# Patient Record
Sex: FEMALE | Race: BLACK OR AFRICAN AMERICAN | NOT HISPANIC OR LATINO | Employment: OTHER | URBAN - METROPOLITAN AREA
[De-identification: names, ages, dates, MRNs, and addresses within clinical notes are randomized per-mention and may not be internally consistent; named-entity substitution may affect disease eponyms.]

---

## 2023-03-26 LAB — URINE CULTURE: ABNORMAL

## 2023-11-30 DIAGNOSIS — M10.9 GOUT, UNSPECIFIED CAUSE, UNSPECIFIED CHRONICITY, UNSPECIFIED SITE: Primary | ICD-10-CM

## 2023-11-30 DIAGNOSIS — I10 ESSENTIAL (PRIMARY) HYPERTENSION: ICD-10-CM

## 2023-12-05 RX ORDER — DILTIAZEM HYDROCHLORIDE 300 MG/1
300 CAPSULE, COATED, EXTENDED RELEASE ORAL DAILY
Qty: 100 CAPSULE | Refills: 0 | Status: SHIPPED | OUTPATIENT
Start: 2023-12-05 | End: 2024-02-12

## 2023-12-05 RX ORDER — ALLOPURINOL 100 MG/1
100 TABLET ORAL DAILY
Qty: 100 TABLET | Refills: 0 | Status: SHIPPED | OUTPATIENT
Start: 2023-12-05 | End: 2024-02-12

## 2023-12-05 RX ORDER — ATENOLOL 50 MG/1
50 TABLET ORAL DAILY
Qty: 100 TABLET | Refills: 0 | Status: SHIPPED | OUTPATIENT
Start: 2023-12-05 | End: 2024-02-12

## 2023-12-05 RX ORDER — HYDROCHLOROTHIAZIDE 25 MG/1
25 TABLET ORAL DAILY
Qty: 100 TABLET | Refills: 0 | Status: SHIPPED | OUTPATIENT
Start: 2023-12-05 | End: 2024-02-12

## 2024-01-10 ENCOUNTER — LAB (OUTPATIENT)
Dept: LAB | Facility: LAB | Age: 74
End: 2024-01-10
Payer: MEDICARE

## 2024-01-10 ENCOUNTER — OFFICE VISIT (OUTPATIENT)
Dept: PRIMARY CARE | Facility: CLINIC | Age: 74
End: 2024-01-10
Payer: MEDICARE

## 2024-01-10 VITALS
HEART RATE: 76 BPM | DIASTOLIC BLOOD PRESSURE: 82 MMHG | BODY MASS INDEX: 35.2 KG/M2 | HEIGHT: 66 IN | SYSTOLIC BLOOD PRESSURE: 126 MMHG | WEIGHT: 219 LBS | TEMPERATURE: 98.2 F

## 2024-01-10 DIAGNOSIS — Z00.00 ANNUAL PHYSICAL EXAM: ICD-10-CM

## 2024-01-10 DIAGNOSIS — E55.9 VITAMIN D DEFICIENCY: ICD-10-CM

## 2024-01-10 DIAGNOSIS — E78.2 MIXED HYPERLIPIDEMIA: ICD-10-CM

## 2024-01-10 DIAGNOSIS — M1A.9XX0 CHRONIC GOUT WITHOUT TOPHUS, UNSPECIFIED CAUSE, UNSPECIFIED SITE: ICD-10-CM

## 2024-01-10 DIAGNOSIS — Z23 NEED FOR VACCINATION FOR PNEUMOCOCCUS: ICD-10-CM

## 2024-01-10 DIAGNOSIS — Z00.00 ENCOUNTER FOR MEDICARE ANNUAL WELLNESS EXAM: Primary | ICD-10-CM

## 2024-01-10 DIAGNOSIS — D64.9 ANEMIA, UNSPECIFIED TYPE: ICD-10-CM

## 2024-01-10 DIAGNOSIS — Z13.21 ENCOUNTER FOR VITAMIN DEFICIENCY SCREENING: ICD-10-CM

## 2024-01-10 DIAGNOSIS — Z13.29 SCREENING FOR THYROID DISORDER: ICD-10-CM

## 2024-01-10 DIAGNOSIS — Z13.0 SCREENING, ANEMIA, DEFICIENCY, IRON: ICD-10-CM

## 2024-01-10 DIAGNOSIS — F10.10 ALCOHOL ABUSE: ICD-10-CM

## 2024-01-10 DIAGNOSIS — Z13.1 SCREENING FOR DIABETES MELLITUS: ICD-10-CM

## 2024-01-10 DIAGNOSIS — I10 ESSENTIAL (PRIMARY) HYPERTENSION: ICD-10-CM

## 2024-01-10 DIAGNOSIS — R73.9 HYPERGLYCEMIA: ICD-10-CM

## 2024-01-10 DIAGNOSIS — E66.01 MORBID OBESITY DUE TO EXCESS CALORIES (MULTI): ICD-10-CM

## 2024-01-10 DIAGNOSIS — F10.20 UNCOMPLICATED ALCOHOL DEPENDENCE (MULTI): ICD-10-CM

## 2024-01-10 LAB
25(OH)D3 SERPL-MCNC: >120 NG/ML (ref 30–100)
ALBUMIN SERPL BCP-MCNC: 3.8 G/DL (ref 3.4–5)
ALP SERPL-CCNC: 78 U/L (ref 33–136)
ALT SERPL W P-5'-P-CCNC: 16 U/L (ref 7–45)
AMORPH CRY #/AREA UR COMP ASSIST: ABNORMAL /HPF
ANION GAP SERPL CALC-SCNC: 14 MMOL/L (ref 10–20)
APPEARANCE UR: ABNORMAL
AST SERPL W P-5'-P-CCNC: 43 U/L (ref 9–39)
BACTERIA #/AREA URNS AUTO: ABNORMAL /HPF
BASOPHILS # BLD AUTO: 0.07 X10*3/UL (ref 0–0.1)
BASOPHILS NFR BLD AUTO: 1.5 %
BILIRUB SERPL-MCNC: 0.9 MG/DL (ref 0–1.2)
BILIRUB UR STRIP.AUTO-MCNC: NEGATIVE MG/DL
BUN SERPL-MCNC: 10 MG/DL (ref 6–23)
CALCIUM SERPL-MCNC: 9.8 MG/DL (ref 8.6–10.3)
CHLORIDE SERPL-SCNC: 94 MMOL/L (ref 98–107)
CHOLEST SERPL-MCNC: 218 MG/DL (ref 0–199)
CHOLESTEROL/HDL RATIO: 2.2
CO2 SERPL-SCNC: 30 MMOL/L (ref 21–32)
COLOR UR: YELLOW
CREAT SERPL-MCNC: 0.86 MG/DL (ref 0.5–1.05)
EGFRCR SERPLBLD CKD-EPI 2021: 72 ML/MIN/1.73M*2
EOSINOPHIL # BLD AUTO: 0.17 X10*3/UL (ref 0–0.4)
EOSINOPHIL NFR BLD AUTO: 3.7 %
ERYTHROCYTE [DISTWIDTH] IN BLOOD BY AUTOMATED COUNT: 12.7 % (ref 11.5–14.5)
GLUCOSE SERPL-MCNC: 89 MG/DL (ref 74–99)
GLUCOSE UR STRIP.AUTO-MCNC: NEGATIVE MG/DL
HCT VFR BLD AUTO: 39.9 % (ref 36–46)
HDLC SERPL-MCNC: 100.8 MG/DL
HGB BLD-MCNC: 13.3 G/DL (ref 12–16)
HYALINE CASTS #/AREA URNS AUTO: ABNORMAL /LPF
IMM GRANULOCYTES # BLD AUTO: 0.01 X10*3/UL (ref 0–0.5)
IMM GRANULOCYTES NFR BLD AUTO: 0.2 % (ref 0–0.9)
KETONES UR STRIP.AUTO-MCNC: ABNORMAL MG/DL
LDLC SERPL CALC-MCNC: 98 MG/DL
LEUKOCYTE ESTERASE UR QL STRIP.AUTO: ABNORMAL
LYMPHOCYTES # BLD AUTO: 1.45 X10*3/UL (ref 0.8–3)
LYMPHOCYTES NFR BLD AUTO: 31.9 %
MCH RBC QN AUTO: 33.7 PG (ref 26–34)
MCHC RBC AUTO-ENTMCNC: 33.3 G/DL (ref 32–36)
MCV RBC AUTO: 101 FL (ref 80–100)
MONOCYTES # BLD AUTO: 0.4 X10*3/UL (ref 0.05–0.8)
MONOCYTES NFR BLD AUTO: 8.8 %
MUCOUS THREADS #/AREA URNS AUTO: ABNORMAL /LPF
NEUTROPHILS # BLD AUTO: 2.45 X10*3/UL (ref 1.6–5.5)
NEUTROPHILS NFR BLD AUTO: 53.9 %
NITRITE UR QL STRIP.AUTO: NEGATIVE
NON HDL CHOLESTEROL: 117 MG/DL (ref 0–149)
NRBC BLD-RTO: 0 /100 WBCS (ref 0–0)
PH UR STRIP.AUTO: 5 [PH]
PLATELET # BLD AUTO: 426 X10*3/UL (ref 150–450)
POTASSIUM SERPL-SCNC: 4.6 MMOL/L (ref 3.5–5.3)
PROT SERPL-MCNC: 6.7 G/DL (ref 6.4–8.2)
PROT UR STRIP.AUTO-MCNC: NEGATIVE MG/DL
RBC # BLD AUTO: 3.95 X10*6/UL (ref 4–5.2)
RBC # UR STRIP.AUTO: NEGATIVE /UL
RBC #/AREA URNS AUTO: ABNORMAL /HPF
SODIUM SERPL-SCNC: 133 MMOL/L (ref 136–145)
SP GR UR STRIP.AUTO: 1.01
SQUAMOUS #/AREA URNS AUTO: ABNORMAL /HPF
TRIGL SERPL-MCNC: 94 MG/DL (ref 0–149)
TSH SERPL-ACNC: 3.2 MIU/L (ref 0.44–3.98)
UROBILINOGEN UR STRIP.AUTO-MCNC: <2 MG/DL
VLDL: 19 MG/DL (ref 0–40)
WBC # BLD AUTO: 4.6 X10*3/UL (ref 4.4–11.3)
WBC #/AREA URNS AUTO: ABNORMAL /HPF

## 2024-01-10 PROCEDURE — 83036 HEMOGLOBIN GLYCOSYLATED A1C: CPT

## 2024-01-10 PROCEDURE — 3008F BODY MASS INDEX DOCD: CPT | Performed by: INTERNAL MEDICINE

## 2024-01-10 PROCEDURE — 1036F TOBACCO NON-USER: CPT | Performed by: INTERNAL MEDICINE

## 2024-01-10 PROCEDURE — 84443 ASSAY THYROID STIM HORMONE: CPT

## 2024-01-10 PROCEDURE — 85025 COMPLETE CBC W/AUTO DIFF WBC: CPT

## 2024-01-10 PROCEDURE — G0444 DEPRESSION SCREEN ANNUAL: HCPCS | Performed by: INTERNAL MEDICINE

## 2024-01-10 PROCEDURE — 80053 COMPREHEN METABOLIC PANEL: CPT

## 2024-01-10 PROCEDURE — G0009 ADMIN PNEUMOCOCCAL VACCINE: HCPCS | Performed by: INTERNAL MEDICINE

## 2024-01-10 PROCEDURE — 1170F FXNL STATUS ASSESSED: CPT | Performed by: INTERNAL MEDICINE

## 2024-01-10 PROCEDURE — 90677 PCV20 VACCINE IM: CPT | Performed by: INTERNAL MEDICINE

## 2024-01-10 PROCEDURE — 36415 COLL VENOUS BLD VENIPUNCTURE: CPT

## 2024-01-10 PROCEDURE — 82306 VITAMIN D 25 HYDROXY: CPT

## 2024-01-10 PROCEDURE — 80061 LIPID PANEL: CPT

## 2024-01-10 PROCEDURE — 3079F DIAST BP 80-89 MM HG: CPT | Performed by: INTERNAL MEDICINE

## 2024-01-10 PROCEDURE — 1159F MED LIST DOCD IN RCRD: CPT | Performed by: INTERNAL MEDICINE

## 2024-01-10 PROCEDURE — 81001 URINALYSIS AUTO W/SCOPE: CPT

## 2024-01-10 PROCEDURE — 3074F SYST BP LT 130 MM HG: CPT | Performed by: INTERNAL MEDICINE

## 2024-01-10 PROCEDURE — 99214 OFFICE O/P EST MOD 30 MIN: CPT | Performed by: INTERNAL MEDICINE

## 2024-01-10 PROCEDURE — G0442 ANNUAL ALCOHOL SCREEN 15 MIN: HCPCS | Performed by: INTERNAL MEDICINE

## 2024-01-10 PROCEDURE — G0439 PPPS, SUBSEQ VISIT: HCPCS | Performed by: INTERNAL MEDICINE

## 2024-01-10 RX ORDER — ESTRADIOL 0.1 MG/G
CREAM VAGINAL
COMMUNITY
Start: 2021-05-18

## 2024-01-10 RX ORDER — CLOBETASOL PROPIONATE 0.5 MG/G
CREAM TOPICAL
COMMUNITY
End: 2024-01-24 | Stop reason: SDUPTHER

## 2024-01-10 RX ORDER — FUROSEMIDE 20 MG/1
20 TABLET ORAL DAILY
COMMUNITY
End: 2024-01-10 | Stop reason: ALTCHOICE

## 2024-01-10 RX ORDER — TRAMADOL HYDROCHLORIDE 50 MG/1
TABLET ORAL
COMMUNITY
Start: 2023-09-26 | End: 2024-01-10 | Stop reason: ALTCHOICE

## 2024-01-10 ASSESSMENT — ENCOUNTER SYMPTOMS
DEPRESSION: 0
OCCASIONAL FEELINGS OF UNSTEADINESS: 0
LOSS OF SENSATION IN FEET: 0

## 2024-01-10 ASSESSMENT — ACTIVITIES OF DAILY LIVING (ADL)
TAKING_MEDICATION: INDEPENDENT
DOING_HOUSEWORK: INDEPENDENT
MANAGING_FINANCES: INDEPENDENT
DRESSING: INDEPENDENT
GROCERY_SHOPPING: INDEPENDENT
BATHING: INDEPENDENT

## 2024-01-10 ASSESSMENT — PATIENT HEALTH QUESTIONNAIRE - PHQ9
1. LITTLE INTEREST OR PLEASURE IN DOING THINGS: NOT AT ALL
SUM OF ALL RESPONSES TO PHQ9 QUESTIONS 1 AND 2: 0
2. FEELING DOWN, DEPRESSED OR HOPELESS: NOT AT ALL

## 2024-01-10 ASSESSMENT — COLUMBIA-SUICIDE SEVERITY RATING SCALE - C-SSRS
1. IN THE PAST MONTH, HAVE YOU WISHED YOU WERE DEAD OR WISHED YOU COULD GO TO SLEEP AND NOT WAKE UP?: NO
2. HAVE YOU ACTUALLY HAD ANY THOUGHTS OF KILLING YOURSELF?: NO
6. HAVE YOU EVER DONE ANYTHING, STARTED TO DO ANYTHING, OR PREPARED TO DO ANYTHING TO END YOUR LIFE?: NO

## 2024-01-10 NOTE — PROGRESS NOTES
Primary Care Physician: Elliot Reeys MD    Date of Visit: 01/10/2024    Chief Complaint:     Chief Complaint   Patient presents with    Medicare Annual Wellness Visit Subsequent       Subjective:  Patient Brie Trevino is a 73 y.o. female  that presents for Medicare Wellness    HPI:  HPI  Pt is here for medicare well visit.  States that her overall health is good    However, she is lonely.  She has friends.  Does go out. Plays bingo.  But, she is still lonely.  States that she has been consuming etoh; sometimes up to a half a pint daily. Does not drink and drive.  Denies depression    Had a life screening test.  Showed high chol.  She has had high chol, but did not want to start medication.    Past Medical History:  History reviewed. No pertinent past medical history.     Surgical History:  History reviewed. No pertinent surgical history.     Immunizations:   Immunization History   Administered Date(s) Administered    Pfizer Purple Cap SARS-CoV-2 04/18/2021, 05/10/2021, 12/08/2021        Family History:  No family history on file.     Social History:  Social History     Socioeconomic History    Marital status: Single     Spouse name: Not on file    Number of children: Not on file    Years of education: Not on file    Highest education level: Not on file   Occupational History    Not on file   Tobacco Use    Smoking status: Never     Passive exposure: Never    Smokeless tobacco: Never   Substance and Sexual Activity    Alcohol use: Not on file    Drug use: Not on file    Sexual activity: Not on file   Other Topics Concern    Not on file   Social History Narrative    Not on file     Social Determinants of Health     Financial Resource Strain: Not on file   Food Insecurity: Not on file   Transportation Needs: Not on file   Physical Activity: Not on file   Stress: Not on file   Social Connections: Not on file   Intimate Partner Violence: Not on file   Housing Stability: Not on file        Medications:  Current  "Outpatient Medications   Medication Instructions    allopurinol (ZYLOPRIM) 100 mg, oral, Daily    atenolol (TENORMIN) 50 mg, oral, Daily    clobetasol (Temovate) 0.05 % cream APPLY THIN LAYER TO AFFECTED AREA TWICE A DAY    dilTIAZem CD (CARDIZEM CD) 300 mg, oral, Daily    estradiol (Estrace) 0.01 % (0.1 mg/gram) vaginal cream vaginal    hydroCHLOROthiazide (HYDRODIURIL) 25 mg, oral, Daily        Allergies:  Allergies   Allergen Reactions    Lisinopril Swelling     Swelling on face        ROS:  Review of Systems   Constitutional:  Negative for activity change, chills, fatigue, fever and unexpected weight change.   HENT:  Negative for congestion, dental problem, ear pain, sinus pressure, sinus pain, sore throat and trouble swallowing.    Eyes:  Negative for photophobia, discharge, redness and visual disturbance.   Respiratory:  Negative for cough, chest tightness, shortness of breath and wheezing.    Cardiovascular:  Negative for chest pain, palpitations and leg swelling.   Gastrointestinal:  Negative for abdominal pain, blood in stool, constipation, diarrhea, nausea and vomiting.   Endocrine: Negative for cold intolerance, heat intolerance, polydipsia, polyphagia and polyuria.   Genitourinary:  Negative for difficulty urinating, dysuria, flank pain, frequency and urgency.   Musculoskeletal:  Negative for arthralgias, joint swelling and myalgias.   Skin:  Negative for color change and rash.   Allergic/Immunologic: Negative for environmental allergies, food allergies and immunocompromised state.   Neurological:  Negative for dizziness, weakness, light-headedness, numbness and headaches.   Hematological:  Does not bruise/bleed easily.   Psychiatric/Behavioral:  Negative for dysphoric mood and sleep disturbance. The patient is not nervous/anxious.         No anxiety.  No depression        Vitals:  /82   Pulse 76   Temp 36.8 °C (98.2 °F)   Ht 1.676 m (5' 6\")   Wt 99.3 kg (219 lb)   BMI 35.35 kg/m²   Wt " Readings from Last 2 Encounters:   01/10/24 99.3 kg (219 lb)       Physical Exam:  Physical Exam  Constitutional:       General: She is not in acute distress.     Appearance: Normal appearance. She is well-developed and well-groomed. She is obese.   HENT:      Head: Normocephalic and atraumatic.      Right Ear: Tympanic membrane and ear canal normal.      Left Ear: Tympanic membrane and ear canal normal.      Nose: Nose normal.      Mouth/Throat:      Mouth: Mucous membranes are moist.      Pharynx: Oropharynx is clear.   Eyes:      Conjunctiva/sclera: Conjunctivae normal.      Pupils: Pupils are equal, round, and reactive to light.   Neck:      Thyroid: No thyromegaly.   Cardiovascular:      Rate and Rhythm: Normal rate and regular rhythm.      Heart sounds: Normal heart sounds, S1 normal and S2 normal. No murmur heard.  Pulmonary:      Effort: Pulmonary effort is normal.      Breath sounds: Normal breath sounds and air entry. No wheezing, rhonchi or rales.   Abdominal:      General: Bowel sounds are normal. There is no distension.      Palpations: Abdomen is soft.      Tenderness: There is no abdominal tenderness. There is no guarding or rebound.   Musculoskeletal:         General: No swelling or tenderness. Normal range of motion.      Cervical back: Normal, full passive range of motion without pain, normal range of motion and neck supple.      Thoracic back: Normal.      Lumbar back: Normal.      Right lower leg: No edema.      Left lower leg: No edema.      Comments: Upper and lower extrems are wnl--inspection and palpation.   Strength is normal and symmetric.   Lymphadenopathy:      Cervical: No cervical adenopathy.   Skin:     General: Skin is warm and dry.      Findings: No bruising, erythema, lesion or rash.      Comments: Intact.   Had a wound on the lower left leg following a burst blister.  Area has healed. Center is pink with hyperpigmentation surrounding.   Neurological:      General: No focal deficit  present.      Mental Status: She is alert and oriented to person, place, and time.      Sensory: Sensation is intact.      Motor: Motor function is intact.      Deep Tendon Reflexes: Reflexes are normal and symmetric.   Psychiatric:         Mood and Affect: Mood and affect normal.         Speech: Speech normal.         Behavior: Behavior normal. Behavior is cooperative.           Orders:  No orders of the defined types were placed in this encounter.        Future Appointments:  Future Appointments   Date Time Provider Department [place]   2/21/2024  9:15 AM Susan L Mayne, APRN-CNP [id number]             Assessment/Plan:  Diagnoses and all orders for this visit:  Encounter for Medicare annual wellness exam  Annual physical exam  -     CBC and Auto Differential; Future  -     Comprehensive Metabolic Panel; Future  -     Lipid Panel; Future  -     Urinalysis with Reflex Microscopic  -     Vitamin D 25-Hydroxy,Total (for eval of Vitamin D levels); Future  -     TSH with reflex to Free T4 if abnormal; Future  -     Hemoglobin A1C; Future  -     Microscopic Only, Urine  Mixed hyperlipidemia  -     Lipid Panel; Future  Essential (primary) hypertension  Chronic gout without tophus, unspecified cause, unspecified site  Encounter for vitamin deficiency screening  -     Vitamin D 25-Hydroxy,Total (for eval of Vitamin D levels); Future  Screening for thyroid disorder  -     TSH with reflex to Free T4 if abnormal; Future  Screening, anemia, deficiency, iron  -     CBC and Auto Differential; Future  Screening for diabetes mellitus  -     Hemoglobin A1C; Future  Hyperglycemia  -     Hemoglobin A1C; Future  Anemia, unspecified type  -     CBC and Auto Differential; Future  Vitamin D deficiency  -     Vitamin D 25-Hydroxy,Total (for eval of Vitamin D levels); Future  Need for vaccination for pneumococcus  -     Pneumococcal conjugate vaccine, 20-valent (PREVNAR 20)  Morbid obesity due to excess calories (CMS/[facility])  BMI  35.0-35.9,adult  Alcohol abuse  Uncomplicated alcohol dependence (CMS/HCC)                Elliot Reyes MD

## 2024-01-11 PROBLEM — I10 ESSENTIAL (PRIMARY) HYPERTENSION: Status: ACTIVE | Noted: 2024-01-11

## 2024-01-11 PROBLEM — Z00.00 ENCOUNTER FOR MEDICARE ANNUAL WELLNESS EXAM: Status: ACTIVE | Noted: 2024-01-11

## 2024-01-11 PROBLEM — E78.2 MIXED HYPERLIPIDEMIA: Status: ACTIVE | Noted: 2024-01-11

## 2024-01-11 PROBLEM — M1A.9XX0 CHRONIC GOUT WITHOUT TOPHUS: Status: ACTIVE | Noted: 2024-01-11

## 2024-01-11 LAB
EST. AVERAGE GLUCOSE BLD GHB EST-MCNC: 100 MG/DL
HBA1C MFR BLD: 5.1 %

## 2024-01-11 ASSESSMENT — ENCOUNTER SYMPTOMS
POLYPHAGIA: 0
HEADACHES: 0
PALPITATIONS: 0
SINUS PAIN: 0
VOMITING: 0
NUMBNESS: 0
JOINT SWELLING: 0
ABDOMINAL PAIN: 0
UNEXPECTED WEIGHT CHANGE: 0
ARTHRALGIAS: 0
EYE DISCHARGE: 0
DIARRHEA: 0
BRUISES/BLEEDS EASILY: 0
SORE THROAT: 0
COUGH: 0
NERVOUS/ANXIOUS: 0
CHILLS: 0
FEVER: 0
DYSURIA: 0
WHEEZING: 0
WEAKNESS: 0
FREQUENCY: 0
DYSPHORIC MOOD: 0
TROUBLE SWALLOWING: 0
SINUS PRESSURE: 0
ACTIVITY CHANGE: 0
MYALGIAS: 0
PHOTOPHOBIA: 0
NAUSEA: 0
SLEEP DISTURBANCE: 0
DIZZINESS: 0
SHORTNESS OF BREATH: 0
FLANK PAIN: 0
EYE REDNESS: 0
BLOOD IN STOOL: 0
DIFFICULTY URINATING: 0
CHEST TIGHTNESS: 0
CONSTIPATION: 0
LIGHT-HEADEDNESS: 0
FATIGUE: 0
COLOR CHANGE: 0
POLYDIPSIA: 0

## 2024-01-24 DIAGNOSIS — L43.9 LICHEN PLANUS: Primary | ICD-10-CM

## 2024-01-26 RX ORDER — CLOBETASOL PROPIONATE 0.5 MG/G
CREAM TOPICAL
Qty: 60 G | Refills: 1 | Status: SHIPPED | OUTPATIENT
Start: 2024-01-26 | End: 2024-03-13 | Stop reason: SDUPTHER

## 2024-02-01 ENCOUNTER — TELEPHONE (OUTPATIENT)
Dept: DERMATOLOGY | Facility: CLINIC | Age: 74
End: 2024-02-01
Payer: MEDICARE

## 2024-02-01 NOTE — TELEPHONE ENCOUNTER
Pt called at this time and states that she was rescheduled for 2/16/24 and is unable to make that appointment.     Can someone reach out to patient to reschedule her for sometime after 2/20/24?    Thank you!    Benson Armenta LPN

## 2024-02-06 DIAGNOSIS — I10 ESSENTIAL (PRIMARY) HYPERTENSION: ICD-10-CM

## 2024-02-06 DIAGNOSIS — M10.9 GOUT, UNSPECIFIED CAUSE, UNSPECIFIED CHRONICITY, UNSPECIFIED SITE: ICD-10-CM

## 2024-02-12 RX ORDER — ATENOLOL 50 MG/1
50 TABLET ORAL DAILY
Qty: 100 TABLET | Refills: 1 | Status: SHIPPED | OUTPATIENT
Start: 2024-02-12 | End: 2024-08-30

## 2024-02-12 RX ORDER — ALLOPURINOL 100 MG/1
100 TABLET ORAL DAILY
Qty: 100 TABLET | Refills: 1 | Status: SHIPPED | OUTPATIENT
Start: 2024-02-12 | End: 2024-08-30

## 2024-02-12 RX ORDER — HYDROCHLOROTHIAZIDE 25 MG/1
25 TABLET ORAL DAILY
Qty: 100 TABLET | Refills: 1 | Status: SHIPPED | OUTPATIENT
Start: 2024-02-12 | End: 2024-08-30

## 2024-02-12 RX ORDER — DILTIAZEM HYDROCHLORIDE 300 MG/1
300 CAPSULE, COATED, EXTENDED RELEASE ORAL DAILY
Qty: 100 CAPSULE | Refills: 1 | Status: SHIPPED | OUTPATIENT
Start: 2024-02-12 | End: 2024-08-30

## 2024-02-16 ENCOUNTER — APPOINTMENT (OUTPATIENT)
Dept: DERMATOLOGY | Facility: CLINIC | Age: 74
End: 2024-02-16
Payer: MEDICARE

## 2024-03-13 ENCOUNTER — OFFICE VISIT (OUTPATIENT)
Dept: DERMATOLOGY | Facility: CLINIC | Age: 74
End: 2024-03-13
Payer: MEDICARE

## 2024-03-13 DIAGNOSIS — L85.3 XEROSIS CUTIS: Primary | ICD-10-CM

## 2024-03-13 DIAGNOSIS — L90.0 LICHEN SCLEROSUS ET ATROPHICUS: ICD-10-CM

## 2024-03-13 DIAGNOSIS — L81.4 LENTIGO: ICD-10-CM

## 2024-03-13 DIAGNOSIS — L43.9 LICHEN PLANUS: ICD-10-CM

## 2024-03-13 DIAGNOSIS — D18.01 HEMANGIOMA OF SKIN: ICD-10-CM

## 2024-03-13 DIAGNOSIS — Z12.83 ENCOUNTER FOR SCREENING FOR MALIGNANT NEOPLASM OF SKIN: ICD-10-CM

## 2024-03-13 DIAGNOSIS — R21 RASH AND OTHER NONSPECIFIC SKIN ERUPTION: ICD-10-CM

## 2024-03-13 DIAGNOSIS — L82.1 SEBORRHEIC KERATOSIS: ICD-10-CM

## 2024-03-13 PROCEDURE — 1036F TOBACCO NON-USER: CPT | Performed by: NURSE PRACTITIONER

## 2024-03-13 PROCEDURE — 1159F MED LIST DOCD IN RCRD: CPT | Performed by: NURSE PRACTITIONER

## 2024-03-13 PROCEDURE — 99214 OFFICE O/P EST MOD 30 MIN: CPT | Performed by: NURSE PRACTITIONER

## 2024-03-13 PROCEDURE — 3008F BODY MASS INDEX DOCD: CPT | Performed by: NURSE PRACTITIONER

## 2024-03-13 RX ORDER — CLOBETASOL PROPIONATE 0.5 MG/G
CREAM TOPICAL
Qty: 60 G | Refills: 2 | Status: SHIPPED | OUTPATIENT
Start: 2024-03-13

## 2024-03-13 RX ORDER — AMMONIUM LACTATE 12 G/100G
CREAM TOPICAL
Qty: 385 G | Refills: 9 | Status: SHIPPED | OUTPATIENT
Start: 2024-03-13

## 2024-03-13 RX ORDER — TRIAMCINOLONE ACETONIDE 1 MG/G
CREAM TOPICAL
Qty: 80 G | Refills: 3 | Status: SHIPPED | OUTPATIENT
Start: 2024-03-13

## 2024-03-13 NOTE — PROGRESS NOTES
Subjective     Brie Trevino is a 74 y.o. female who presents for the following: No chief complaint on file..     Review of Systems:  No other skin or systemic complaints other than what is documented elsewhere in the note.    The following portions of the chart were reviewed this encounter and updated as appropriate:       Skin Cancer History  No skin cancer on file.    Specialty Problems    None    Past Medical History:  Brie Trevino  has no past medical history on file.    Past Surgical History:  Brie Trevino  has no past surgical history on file.    Family History:  Patient family history is not on file.    Social History:  Brie Trevino  reports that she has never smoked. She has never been exposed to tobacco smoke. She has never used smokeless tobacco. No history on file for alcohol use and drug use.    Allergies:  Lisinopril    Current Medications / CAM's:    Current Outpatient Medications:     allopurinol (Zyloprim) 100 mg tablet, Take 1 tablet (100 mg) by mouth once daily., Disp: 100 tablet, Rfl: 1    ammonium lactate (Amlactin) 12 % cream, To arms, legs, and trunk daily after shower., Disp: 385 g, Rfl: 9    atenolol (Tenormin) 50 mg tablet, Take 1 tablet (50 mg) by mouth once daily., Disp: 100 tablet, Rfl: 1    clobetasol (Temovate) 0.05 % cream, Thin layer to affected area daily as needed, Disp: 60 g, Rfl: 2    dilTIAZem CD (Cardizem CD) 300 mg 24 hr capsule, Take 1 capsule (300 mg) by mouth once daily., Disp: 100 capsule, Rfl: 1    estradiol (Estrace) 0.01 % (0.1 mg/gram) vaginal cream, Insert into the vagina., Disp: , Rfl:     hydroCHLOROthiazide (HYDRODiuril) 25 mg tablet, Take 1 tablet (25 mg) by mouth once daily., Disp: 100 tablet, Rfl: 1    triamcinolone (Kenalog) 0.1 % cream, Thin coat twice daily to affected areas on  for 3-4 weeks, then weekends only. Repeat every few months for flares., Disp: 80 g, Rfl: 3     Objective   Well appearing patient in no apparent distress; mood and affect are  within normal limits.    A focused skin examination was performed. All findings within normal limits unless otherwise noted below.    Assessment/Plan   1. Xerosis cutis  Fine, ashy, pale to white scale diffusely over skin.    Often itchy, dry skin is caused by environmental factors, such as cold weather and frequent bathing, and by medical conditions, such as atopic dermatitis and malnutrition. Dry skin develops due to a decrease in the natural oils in the outer layer of skin, which makes the skin lose water.    Healthy bathing habits can improve dry skin:  - Take a bath or shower only once daily. More frequent bathing can make the skin lose water (dehydrate).   - Use lukewarm (not hot) water.   - Limit bath time to 15 minutes.   - Avoid harsh deodorant soaps (or limit their use to armpits, groin, and feet).   - Use non-soap cleansers.    -Pat (don't rub) the skin dry after bathing.   - Apply moisturizer immediately after bathing, while the skin is still moist.   - When choosing a moisturizer, look for oil-based creams and ointments, which work better than water-based lotions.     The following over-the-counter products may be helpful:  - Petrolatum or petroleum jelly (Vaseline)   - Fragrance-free creams or ointments   - Preparations containing alpha-hydroxy acids such as glycolic acid or lactic acid   - Creams containing urea   - Over-the-counter cortisone cream (if the areas are itchy)   - Topical antibiotics applied immediately to any cracks in the skin to help prevent infection    Plan  - Follow abovementioned guidelines.   - Risks, benefits, side effects, alternatives and options were discussed with patient and the patient voiced understanding.      Related Medications  ammonium lactate (Amlactin) 12 % cream  To arms, legs, and trunk daily after shower.    2. Rash and other nonspecific skin eruption    Related Medications  triamcinolone (Kenalog) 0.1 % cream  Thin coat twice daily to affected areas on  for 3-4  weeks, then weekends only. Repeat every few months for flares.    3. Lichen planus    Related Medications  clobetasol (Temovate) 0.05 % cream  Thin layer to affected area daily as needed    4. Encounter for screening for malignant neoplasm of skin  Scattered benign lesions    - Protective measures, such as avoiding skin exposure to sunlight during peak sun hours (10 AM to 3 PM), wearing protective clothing, and applying high-SPF sunscreen, are essential for reducing exposure to harmful ultraviolet (UV) light.  - Monthly self-examination of the skin is helpful to detect new lesions or changes in existing lesions.  - Discussed signs and symptoms of sun-related skin cancers.   - Make sure your moles are not signs of skin cancer (melanoma). Remember the ABCDEs of melanoma lesions:  A - Asymmetry: One half of the lesion does not mirror the other half.  B - Border: The borders are irregular or vague (indistinct).  C - Color: More than one color may be noted within the mole.  D - Diameter: Size greater than 6 mm (roughly the size of a pencil eraser) may be concerning.  E - Evolving: Notable changes in the lesion over time are suspicious signs for skin cancer.    5. Hemangioma of skin  Violaceous/red papule with maroon lagoons     - A cherry hemangioma is a small macule (small, flat, smooth area) or papule (small, solid bump) formed from an overgrowth of tiny blood vessels in the skin. Cherry hemangiomas are characteristically red or purplish in color. They often first appear in middle adulthood and usually increase in number with age. Cherry hemangiomas are noncancerous (benign) and are common in adults.  - Lesions are benign, reassured patient.     6. Seborrheic keratosis  Stuck on verrucous, tan-brown papules and plaques.      Although Seborrheic Keratoses can be troublesome and unsightly, they are entirely benign.  Removal of Seborrheic Keratoses is considered a cosmetic procedure. Removal is typically performed using  liquid nitrogen cryotherapy.  Treatment of current lesions does not prevent the development of new Seborrheic Keratoses in the future.    7. Lentigo  Scattered tan macules in sun-exposed areas.    A solar lentigo (plural, solar lentigines), sometimes called an age spot or liver spot, is a brown macule (small, flat, smooth area of skin) caused by chronic sun or artificial ultraviolet (UV) light exposure. There may be just one lentigo or there may be multiple. This type of lentigo is different from lentigo simplex (discussed separately) because it is caused by exposure to UV light. Solar lentigines are benign, but they do indicate excessive sun exposure, a risk factor for the development of skin cancer.  Lesions are benign, no treatment needed.     8. Lichen sclerosus et atrophicus  Pubic  Lichenified, excoriated papules and plaques.     - Lichen simplex chronicus (LSC), also known as neurodermatitis circumscripta, is an itchy skin condition causing thickened skin at the areas of skin injured by repeated scratching and rubbing. Lichen simplex chronicus is not a primary disease but rather the skin's response to chronic physical injury (trauma). The gradual thickening of skin, caused by repetitive scratching and rubbing, is called lichenification.  - Lichen simplex chronicus begins as itchy skin. The itching leads to scratching and rubbing, which causes thickening of skin. The thickened skin is itchy, which causes more scratching and, thus, more skin thickening. This scratch-itch cycle continues if not treated.  - The primary treatment is to stop scratching. However, this can be very difficult once a scratch-itch cycle has started. Areas of lichen simplex chronicus may need to be covered at night, as many people scratch in their sleep.  Use moisturizers to help relieve itchy skin. When choosing a moisturizer, look for oil-based creams and ointments, which work better than water-based lotions. Apply moisturizers just  after bathing, while the skin is still moist.  Apply over-the-counter hydrocortisone cream to decrease the itch. However, if the itching is limited to the groin area, you may have a fungal infection (jock itch [tinea cruris]) rather than lichen simplex chronicus. Do not apply hydrocortisone to the groin area unless recommended to do so by a doctor.  If there are breaks or cracks in the skin, apply an antibiotic ointment to prevent infection.  Plan  - Continue clobetasol ointment, use as directed.

## 2024-04-10 ENCOUNTER — OFFICE VISIT (OUTPATIENT)
Dept: PRIMARY CARE | Facility: CLINIC | Age: 74
End: 2024-04-10
Payer: MEDICARE

## 2024-04-10 VITALS
HEIGHT: 66 IN | SYSTOLIC BLOOD PRESSURE: 126 MMHG | RESPIRATION RATE: 16 BRPM | TEMPERATURE: 98.2 F | WEIGHT: 186 LBS | HEART RATE: 75 BPM | BODY MASS INDEX: 29.89 KG/M2 | OXYGEN SATURATION: 98 % | DIASTOLIC BLOOD PRESSURE: 76 MMHG

## 2024-04-10 DIAGNOSIS — L30.9 DERMATITIS: ICD-10-CM

## 2024-04-10 DIAGNOSIS — M17.0 PRIMARY OSTEOARTHRITIS OF BOTH KNEES: Primary | ICD-10-CM

## 2024-04-10 PROCEDURE — 3008F BODY MASS INDEX DOCD: CPT | Performed by: INTERNAL MEDICINE

## 2024-04-10 PROCEDURE — 3078F DIAST BP <80 MM HG: CPT | Performed by: INTERNAL MEDICINE

## 2024-04-10 PROCEDURE — 99213 OFFICE O/P EST LOW 20 MIN: CPT | Performed by: INTERNAL MEDICINE

## 2024-04-10 PROCEDURE — 3074F SYST BP LT 130 MM HG: CPT | Performed by: INTERNAL MEDICINE

## 2024-04-10 PROCEDURE — 1159F MED LIST DOCD IN RCRD: CPT | Performed by: INTERNAL MEDICINE

## 2024-04-10 RX ORDER — DESOXIMETASONE 0.5 MG/G
1 CREAM TOPICAL 2 TIMES DAILY
Qty: 60 G | Refills: 0 | Status: SHIPPED | OUTPATIENT
Start: 2024-04-10

## 2024-04-10 NOTE — PROGRESS NOTES
"Primary Care Physician: Elliot Reyes MD    Date of Visit: 04/10/2024     Chief Complaint:   Chief Complaint   Patient presents with    Follow-up    Med Refill       Subjective:   Brie Trevino is a 74 y.o. female presents     HPI:  HPI  Needs a copy of medical records to try to obtain ins thru  group  Would like a  refill of Topicort for dermatitis  Osteoarthritis bilat knees.  Disabiltity placard renewed.    Otherwise, she has been well.   No new c/o    Past Medical History:  No past medical history on file.     Social History:   reports that she has never smoked. She has never been exposed to tobacco smoke. She has never used smokeless tobacco.     Family History:  family history is not on file.      Allergies:  Allergies   Allergen Reactions    Lisinopril Swelling     Swelling on face       Outpatient Medications:  Current Outpatient Medications   Medication Instructions    allopurinol (ZYLOPRIM) 100 mg, oral, Daily    ammonium lactate (Amlactin) 12 % cream To arms, legs, and trunk daily after shower.    atenolol (TENORMIN) 50 mg, oral, Daily    clobetasol (Temovate) 0.05 % cream Thin layer to affected area daily as needed    dilTIAZem CD (CARDIZEM CD) 300 mg, oral, Daily    estradiol (Estrace) 0.01 % (0.1 mg/gram) vaginal cream vaginal    hydroCHLOROthiazide (HYDRODIURIL) 25 mg, oral, Daily    triamcinolone (Kenalog) 0.1 % cream Thin coat twice daily to affected areas on  for 3-4 weeks, then weekends only. Repeat every few months for flares.         ROS:   Review of Systems     Vitals:  /76 (BP Location: Right arm, Patient Position: Sitting, BP Cuff Size: Adult)   Pulse 75   Temp 36.8 °C (98.2 °F) (Temporal)   Resp 16   Ht 1.676 m (5' 6\")   Wt 84.4 kg (186 lb)   SpO2 98%   BMI 30.02 kg/m²   Wt Readings from Last 2 Encounters:   04/10/24 84.4 kg (186 lb)   01/10/24 99.3 kg (219 lb)       Physical Exam:  Physical Exam  Vitals reviewed.   Constitutional:       Appearance: Normal " appearance.   HENT:      Head: Normocephalic and atraumatic.   Cardiovascular:      Rate and Rhythm: Normal rate and regular rhythm.      Heart sounds: Normal heart sounds, S1 normal and S2 normal. No murmur heard.  Pulmonary:      Effort: Pulmonary effort is normal.      Breath sounds: Normal breath sounds. No wheezing, rhonchi or rales.   Neurological:      Mental Status: She is alert and oriented to person, place, and time.          Assessment/Plan   Diagnoses and all orders for this visit:  Primary osteoarthritis of both knees  -     Disability Placard  Dermatitis  -     desoximetasone (Topicort) 0.05 % cream; Apply 1 application. topically 2 times a day.        Orders:  No orders of the defined types were placed in this encounter.       Followup Appts:  No future appointments.        ____________________________________________________________  Elliot Reyes MD

## 2024-07-16 DIAGNOSIS — L30.9 DERMATITIS: ICD-10-CM

## 2024-07-17 RX ORDER — DESOXIMETASONE 0.5 MG/G
1 CREAM TOPICAL 2 TIMES DAILY
Qty: 60 G | Refills: 0 | Status: SHIPPED | OUTPATIENT
Start: 2024-07-17

## 2024-08-08 DIAGNOSIS — I10 ESSENTIAL (PRIMARY) HYPERTENSION: ICD-10-CM

## 2024-08-08 DIAGNOSIS — M10.9 GOUT, UNSPECIFIED CAUSE, UNSPECIFIED CHRONICITY, UNSPECIFIED SITE: ICD-10-CM

## 2024-08-09 RX ORDER — HYDROCHLOROTHIAZIDE 25 MG/1
25 TABLET ORAL DAILY
Qty: 100 TABLET | Refills: 1 | Status: SHIPPED | OUTPATIENT
Start: 2024-08-09

## 2024-08-09 RX ORDER — ATENOLOL 50 MG/1
50 TABLET ORAL DAILY
Qty: 100 TABLET | Refills: 1 | Status: SHIPPED | OUTPATIENT
Start: 2024-08-09

## 2024-08-09 RX ORDER — DILTIAZEM HYDROCHLORIDE 300 MG/1
300 CAPSULE, COATED, EXTENDED RELEASE ORAL DAILY
Qty: 100 CAPSULE | Refills: 1 | Status: SHIPPED | OUTPATIENT
Start: 2024-08-09

## 2024-08-09 RX ORDER — ALLOPURINOL 100 MG/1
100 TABLET ORAL DAILY
Qty: 100 TABLET | Refills: 1 | Status: SHIPPED | OUTPATIENT
Start: 2024-08-09

## 2024-11-18 ENCOUNTER — DOCUMENTATION (OUTPATIENT)
Dept: PRIMARY CARE | Facility: CLINIC | Age: 74
End: 2024-11-18
Payer: MEDICARE

## 2024-11-22 ENCOUNTER — APPOINTMENT (OUTPATIENT)
Dept: PRIMARY CARE | Facility: CLINIC | Age: 74
End: 2024-11-22
Payer: MEDICARE

## 2024-11-22 VITALS
SYSTOLIC BLOOD PRESSURE: 137 MMHG | HEIGHT: 66 IN | DIASTOLIC BLOOD PRESSURE: 79 MMHG | WEIGHT: 184 LBS | HEART RATE: 69 BPM | BODY MASS INDEX: 29.57 KG/M2 | OXYGEN SATURATION: 96 %

## 2024-11-22 DIAGNOSIS — F10.10 ALCOHOL ABUSE: Primary | ICD-10-CM

## 2024-11-22 PROCEDURE — 3078F DIAST BP <80 MM HG: CPT | Performed by: INTERNAL MEDICINE

## 2024-11-22 PROCEDURE — 3075F SYST BP GE 130 - 139MM HG: CPT | Performed by: INTERNAL MEDICINE

## 2024-11-22 PROCEDURE — 3008F BODY MASS INDEX DOCD: CPT | Performed by: INTERNAL MEDICINE

## 2024-11-22 PROCEDURE — 99213 OFFICE O/P EST LOW 20 MIN: CPT | Performed by: INTERNAL MEDICINE

## 2024-11-22 PROCEDURE — 1159F MED LIST DOCD IN RCRD: CPT | Performed by: INTERNAL MEDICINE

## 2024-11-22 NOTE — PROGRESS NOTES
"Primary Care Physician: Elliot Reyes MD    Date of Visit: 11/22/2024     Chief Complaint:   Chief Complaint   Patient presents with    Leg Pain         HPI:  HPI    Subjective:   Brie Trevino is a 74 y.o. female presents   Etoh--states that she has been drinking quite a bit.  States that she often drinks out of boredom.   Has not ever had dt's or blackouts.  Dwp:  recommended that she try AA.       Allergies:  Allergies   Allergen Reactions    Lisinopril Swelling     Swelling on face       Outpatient Medications:  Current Outpatient Medications   Medication Instructions    allopurinol (ZYLOPRIM) 100 mg, oral, Daily    ammonium lactate (Amlactin) 12 % cream To arms, legs, and trunk daily after shower.    atenolol (TENORMIN) 50 mg, oral, Daily    clobetasol (Temovate) 0.05 % cream Thin layer to affected area daily as needed    desoximetasone (Topicort) 0.05 % cream 1 application., Topical, 2 times daily    dilTIAZem CD (CARDIZEM CD) 300 mg, oral, Daily    estradiol (Estrace) 0.01 % (0.1 mg/gram) vaginal cream vaginal    hydroCHLOROthiazide (HYDRODIURIL) 25 mg, oral, Daily    triamcinolone (Kenalog) 0.1 % cream Thin coat twice daily to affected areas on  for 3-4 weeks, then weekends only. Repeat every few months for flares.       ROS:   Review of Systems   Gastrointestinal:  Negative for abdominal distention, abdominal pain, blood in stool, nausea and vomiting.        Vitals:  /79 (BP Location: Right arm, Patient Position: Sitting, BP Cuff Size: Adult)   Pulse 69   Ht 1.676 m (5' 6\")   Wt 83.5 kg (184 lb)   SpO2 96%   BMI 29.70 kg/m²   Wt Readings from Last 3 Encounters:   11/22/24 83.5 kg (184 lb)   04/10/24 84.4 kg (186 lb)   01/10/24 99.3 kg (219 lb)     BP Readings from Last 3 Encounters:   11/22/24 137/79   04/10/24 126/76   01/10/24 126/82        Physical Exam:  Physical Exam  Vitals reviewed.   Constitutional:       Appearance: Normal appearance.   HENT:      Head: Normocephalic and atraumatic. "   Cardiovascular:      Rate and Rhythm: Normal rate and regular rhythm.      Heart sounds: Normal heart sounds, S1 normal and S2 normal. No murmur heard.  Pulmonary:      Effort: Pulmonary effort is normal.      Breath sounds: Normal breath sounds. No wheezing, rhonchi or rales.   Abdominal:      General: Bowel sounds are normal.      Palpations: Abdomen is soft.   Skin:     General: Skin is warm and dry.   Neurological:      General: No focal deficit present.      Mental Status: She is alert and oriented to person, place, and time.          Assessment/Plan   Diagnoses and all orders for this visit:  Alcohol abuse  --recommend AA    Orders:  No orders of the defined types were placed in this encounter.       Followup Appts:  No future appointments.        ____________________________________________________________  Elliot Reyes MD

## 2025-01-10 ENCOUNTER — LAB (OUTPATIENT)
Dept: LAB | Facility: LAB | Age: 75
End: 2025-01-10
Payer: MEDICARE

## 2025-01-10 ENCOUNTER — HOSPITAL ENCOUNTER (OUTPATIENT)
Dept: RADIOLOGY | Facility: EXTERNAL LOCATION | Age: 75
Discharge: HOME | End: 2025-01-10

## 2025-01-10 ENCOUNTER — APPOINTMENT (OUTPATIENT)
Dept: PRIMARY CARE | Facility: CLINIC | Age: 75
End: 2025-01-10
Payer: MEDICARE

## 2025-01-10 VITALS
HEIGHT: 66 IN | HEART RATE: 63 BPM | DIASTOLIC BLOOD PRESSURE: 82 MMHG | WEIGHT: 176.2 LBS | SYSTOLIC BLOOD PRESSURE: 140 MMHG | TEMPERATURE: 96.7 F | BODY MASS INDEX: 28.32 KG/M2 | RESPIRATION RATE: 16 BRPM | OXYGEN SATURATION: 98 %

## 2025-01-10 DIAGNOSIS — H81.10 BENIGN PAROXYSMAL POSITIONAL VERTIGO, UNSPECIFIED LATERALITY: Primary | ICD-10-CM

## 2025-01-10 DIAGNOSIS — D64.9 ANEMIA, UNSPECIFIED TYPE: ICD-10-CM

## 2025-01-10 DIAGNOSIS — E66.01 MORBID OBESITY DUE TO EXCESS CALORIES (MULTI): ICD-10-CM

## 2025-01-10 DIAGNOSIS — E78.2 MIXED HYPERLIPIDEMIA: ICD-10-CM

## 2025-01-10 DIAGNOSIS — Z13.29 SCREENING FOR THYROID DISORDER: ICD-10-CM

## 2025-01-10 DIAGNOSIS — Z12.31 ENCOUNTER FOR SCREENING MAMMOGRAM FOR MALIGNANT NEOPLASM OF BREAST: ICD-10-CM

## 2025-01-10 DIAGNOSIS — I10 ESSENTIAL (PRIMARY) HYPERTENSION: ICD-10-CM

## 2025-01-10 DIAGNOSIS — Z13.1 SCREENING FOR DIABETES MELLITUS: ICD-10-CM

## 2025-01-10 DIAGNOSIS — R73.9 HYPERGLYCEMIA: ICD-10-CM

## 2025-01-10 DIAGNOSIS — M10.9 GOUT, UNSPECIFIED CAUSE, UNSPECIFIED CHRONICITY, UNSPECIFIED SITE: ICD-10-CM

## 2025-01-10 DIAGNOSIS — E55.9 VITAMIN D DEFICIENCY: ICD-10-CM

## 2025-01-10 DIAGNOSIS — H81.10 BENIGN PAROXYSMAL POSITIONAL VERTIGO, UNSPECIFIED LATERALITY: ICD-10-CM

## 2025-01-10 DIAGNOSIS — L43.9 LICHEN PLANUS: ICD-10-CM

## 2025-01-10 DIAGNOSIS — Z00.00 ENCOUNTER FOR MEDICARE ANNUAL WELLNESS EXAM: Primary | ICD-10-CM

## 2025-01-10 DIAGNOSIS — Z00.00 ANNUAL PHYSICAL EXAM: ICD-10-CM

## 2025-01-10 DIAGNOSIS — F10.20 UNCOMPLICATED ALCOHOL DEPENDENCE (MULTI): ICD-10-CM

## 2025-01-10 DIAGNOSIS — R21 RASH AND OTHER NONSPECIFIC SKIN ERUPTION: ICD-10-CM

## 2025-01-10 DIAGNOSIS — L30.9 DERMATITIS: ICD-10-CM

## 2025-01-10 DIAGNOSIS — Z00.00 ENCOUNTER FOR MEDICARE ANNUAL WELLNESS EXAM: ICD-10-CM

## 2025-01-10 LAB
25(OH)D3 SERPL-MCNC: 110 NG/ML (ref 30–100)
ALBUMIN SERPL BCP-MCNC: 3.6 G/DL (ref 3.4–5)
ALP SERPL-CCNC: 58 U/L (ref 33–136)
ALT SERPL W P-5'-P-CCNC: 8 U/L (ref 7–45)
ANION GAP SERPL CALC-SCNC: 14 MMOL/L (ref 10–20)
AST SERPL W P-5'-P-CCNC: 30 U/L (ref 9–39)
BASOPHILS # BLD AUTO: 0.05 X10*3/UL (ref 0–0.1)
BASOPHILS NFR BLD AUTO: 1.2 %
BILIRUB SERPL-MCNC: 0.9 MG/DL (ref 0–1.2)
BUN SERPL-MCNC: 13 MG/DL (ref 6–23)
CALCIUM SERPL-MCNC: 9.8 MG/DL (ref 8.6–10.3)
CHLORIDE SERPL-SCNC: 92 MMOL/L (ref 98–107)
CHOLEST SERPL-MCNC: 224 MG/DL (ref 0–199)
CHOLESTEROL/HDL RATIO: 2.9
CO2 SERPL-SCNC: 28 MMOL/L (ref 21–32)
CREAT SERPL-MCNC: 0.91 MG/DL (ref 0.5–1.05)
EGFRCR SERPLBLD CKD-EPI 2021: 67 ML/MIN/1.73M*2
EOSINOPHIL # BLD AUTO: 0.09 X10*3/UL (ref 0–0.4)
EOSINOPHIL NFR BLD AUTO: 2.2 %
ERYTHROCYTE [DISTWIDTH] IN BLOOD BY AUTOMATED COUNT: 13.2 % (ref 11.5–14.5)
GLUCOSE SERPL-MCNC: 88 MG/DL (ref 74–99)
HCT VFR BLD AUTO: 33.6 % (ref 36–46)
HDLC SERPL-MCNC: 77.6 MG/DL
HGB BLD-MCNC: 11.1 G/DL (ref 12–16)
IMM GRANULOCYTES # BLD AUTO: 0.03 X10*3/UL (ref 0–0.5)
IMM GRANULOCYTES NFR BLD AUTO: 0.7 % (ref 0–0.9)
LDLC SERPL CALC-MCNC: 123 MG/DL
LYMPHOCYTES # BLD AUTO: 1.19 X10*3/UL (ref 0.8–3)
LYMPHOCYTES NFR BLD AUTO: 28.7 %
MCH RBC QN AUTO: 32.6 PG (ref 26–34)
MCHC RBC AUTO-ENTMCNC: 33 G/DL (ref 32–36)
MCV RBC AUTO: 99 FL (ref 80–100)
MONOCYTES # BLD AUTO: 0.43 X10*3/UL (ref 0.05–0.8)
MONOCYTES NFR BLD AUTO: 10.4 %
NEUTROPHILS # BLD AUTO: 2.35 X10*3/UL (ref 1.6–5.5)
NEUTROPHILS NFR BLD AUTO: 56.8 %
NON HDL CHOLESTEROL: 146 MG/DL (ref 0–149)
NRBC BLD-RTO: 0 /100 WBCS (ref 0–0)
PLATELET # BLD AUTO: 386 X10*3/UL (ref 150–450)
POTASSIUM SERPL-SCNC: 4.4 MMOL/L (ref 3.5–5.3)
PROT SERPL-MCNC: 6.2 G/DL (ref 6.4–8.2)
RBC # BLD AUTO: 3.41 X10*6/UL (ref 4–5.2)
SODIUM SERPL-SCNC: 130 MMOL/L (ref 136–145)
TRIGL SERPL-MCNC: 118 MG/DL (ref 0–149)
TSH SERPL-ACNC: 3.37 MIU/L (ref 0.44–3.98)
VLDL: 24 MG/DL (ref 0–40)
WBC # BLD AUTO: 4.1 X10*3/UL (ref 4.4–11.3)

## 2025-01-10 PROCEDURE — 82306 VITAMIN D 25 HYDROXY: CPT

## 2025-01-10 PROCEDURE — 83036 HEMOGLOBIN GLYCOSYLATED A1C: CPT

## 2025-01-10 PROCEDURE — 80061 LIPID PANEL: CPT

## 2025-01-10 PROCEDURE — 85025 COMPLETE CBC W/AUTO DIFF WBC: CPT

## 2025-01-10 PROCEDURE — 80053 COMPREHEN METABOLIC PANEL: CPT

## 2025-01-10 PROCEDURE — 84443 ASSAY THYROID STIM HORMONE: CPT

## 2025-01-10 RX ORDER — CLOBETASOL PROPIONATE 0.5 MG/G
CREAM TOPICAL
Qty: 60 G | Refills: 2 | Status: SHIPPED | OUTPATIENT
Start: 2025-01-10

## 2025-01-10 RX ORDER — ALLOPURINOL 100 MG/1
100 TABLET ORAL DAILY
Qty: 100 TABLET | Refills: 1 | Status: SHIPPED | OUTPATIENT
Start: 2025-01-10

## 2025-01-10 RX ORDER — HYDROCHLOROTHIAZIDE 25 MG/1
25 TABLET ORAL DAILY
Qty: 100 TABLET | Refills: 1 | Status: SHIPPED | OUTPATIENT
Start: 2025-01-10

## 2025-01-10 RX ORDER — ATENOLOL 50 MG/1
50 TABLET ORAL DAILY
Qty: 100 TABLET | Refills: 1 | Status: SHIPPED | OUTPATIENT
Start: 2025-01-10

## 2025-01-10 RX ORDER — TRIAMCINOLONE ACETONIDE 1 MG/G
CREAM TOPICAL
Qty: 80 G | Refills: 3 | Status: SHIPPED | OUTPATIENT
Start: 2025-01-10

## 2025-01-10 RX ORDER — DILTIAZEM HYDROCHLORIDE 300 MG/1
300 CAPSULE, COATED, EXTENDED RELEASE ORAL DAILY
Qty: 100 CAPSULE | Refills: 1 | Status: SHIPPED | OUTPATIENT
Start: 2025-01-10

## 2025-01-10 RX ORDER — DESOXIMETASONE 0.5 MG/G
1 CREAM TOPICAL 2 TIMES DAILY
Qty: 60 G | Refills: 0 | Status: SHIPPED | OUTPATIENT
Start: 2025-01-10

## 2025-01-10 ASSESSMENT — ACTIVITIES OF DAILY LIVING (ADL)
USING TELEPHONE: INDEPENDENT
STIL DRIVING: YES
DOING_HOUSEWORK: INDEPENDENT
GROCERY_SHOPPING: INDEPENDENT
JUDGMENT_ADEQUATE_SAFELY_COMPLETE_DAILY_ACTIVITIES: YES
TAKING_MEDICATION: INDEPENDENT
PATIENT'S MEMORY ADEQUATE TO SAFELY COMPLETE DAILY ACTIVITIES?: YES
USING TRANSPORTATION: INDEPENDENT
TOILETING: INDEPENDENT
BATHING: INDEPENDENT
DRESSING: INDEPENDENT
MANAGING_FINANCES: INDEPENDENT
EATING: INDEPENDENT
ADEQUATE_TO_COMPLETE_ADL: YES
GROOMING: INDEPENDENT
FEEDING YOURSELF: INDEPENDENT
PREPARING MEALS: INDEPENDENT
NEEDS ASSISTANCE WITH FOOD: INDEPENDENT

## 2025-01-10 ASSESSMENT — ENCOUNTER SYMPTOMS
OCCASIONAL FEELINGS OF UNSTEADINESS: 0
LOSS OF SENSATION IN FEET: 0
DEPRESSION: 0

## 2025-01-10 NOTE — PROGRESS NOTES
Primary Care Physician: Elliot Reyes MD    Date of Visit: 01/10/2025    Chief Complaint:     Chief Complaint   Patient presents with    Medicare Annual Wellness Visit Subsequent   Acp:  Destiney Belinda, Kajal Palacios,  Yolanda    Subjective:  Patient Brie Trevino is a 74 y.o. female  that presents for Medicare Wellness    HPI:  HPI   Bppv   Pain in left ear----sounds hollow like under water  She has cut back on etoh use.  Depression--no   Sometimes uses a cane  right  Past Medical History:  No past medical history on file.     Surgical History:  No past surgical history on file.     Immunizations:   Immunization History   Administered Date(s) Administered    COVID-19, mRNA, LNP-S, PF, 30 mcg/0.3 mL dose 04/18/2021, 05/10/2021, 12/08/2021    Pneumococcal conjugate vaccine, 20-valent (PREVNAR 20) 01/10/2024        Family History:  No family history on file.     Social History:  Social History     Socioeconomic History    Marital status: Single     Spouse name: Not on file    Number of children: Not on file    Years of education: Not on file    Highest education level: Not on file   Occupational History    Not on file   Tobacco Use    Smoking status: Never     Passive exposure: Never    Smokeless tobacco: Never   Substance and Sexual Activity    Alcohol use: Not on file    Drug use: Not on file    Sexual activity: Not on file   Other Topics Concern    Not on file   Social History Narrative    Not on file     Social Drivers of Health     Financial Resource Strain: Not on file   Food Insecurity: Not on file   Transportation Needs: Not on file   Physical Activity: Not on file   Stress: Not on file   Social Connections: Not on file   Intimate Partner Violence: Not on file   Housing Stability: Not on file        Medications:  Current Outpatient Medications   Medication Instructions    allopurinol (ZYLOPRIM) 100 mg, oral, Daily    ammonium lactate (Amlactin) 12 % cream To arms, legs, and trunk daily after shower.    atenolol  "(TENORMIN) 50 mg, oral, Daily    clobetasol (Temovate) 0.05 % cream Thin layer to affected area daily as needed    desoximetasone (Topicort) 0.05 % cream 1 application., Topical, 2 times daily    dilTIAZem CD (CARDIZEM CD) 300 mg, oral, Daily    estradiol (Estrace) 0.01 % (0.1 mg/gram) vaginal cream Insert into the vagina.    hydroCHLOROthiazide (HYDRODIURIL) 25 mg, oral, Daily    triamcinolone (Kenalog) 0.1 % cream Thin coat twice daily to affected areas on  for 3-4 weeks, then weekends only. Repeat every few months for flares.        Allergies:  Allergies   Allergen Reactions    Lisinopril Swelling     Swelling on face        ROS:  Review of Systems     Vitals:  /82 (BP Location: Right arm, Patient Position: Sitting, BP Cuff Size: Adult)   Pulse 63   Temp 35.9 °C (96.7 °F) (Temporal)   Resp 16   Ht 1.676 m (5' 6\")   Wt 79.9 kg (176 lb 3.2 oz)   SpO2 98%   BMI 28.44 kg/m²   Wt Readings from Last 2 Encounters:   01/10/25 79.9 kg (176 lb 3.2 oz)   11/22/24 83.5 kg (184 lb)       Physical Exam:  Physical Exam  Neck:        Comments: There is a large lipoma just behind the left clavicle.          Orders:  No orders of the defined types were placed in this encounter.        Future Appointments:  No future appointments.          Assessment/Plan:  {Assess/Plan SmartLinks (Optional):13984}              Elliot Reyes MD  " No murmur heard.  Pulmonary:      Effort: Pulmonary effort is normal.      Breath sounds: Normal breath sounds and air entry. No wheezing, rhonchi or rales.   Abdominal:      General: Bowel sounds are normal. There is no distension.      Palpations: Abdomen is soft.      Tenderness: There is no abdominal tenderness. There is no guarding or rebound.   Musculoskeletal:         General: No swelling or tenderness. Normal range of motion.      Cervical back: Normal, full passive range of motion without pain, normal range of motion and neck supple.      Thoracic back: Normal.      Lumbar back: Normal.      Right lower leg: No edema.      Left lower leg: No edema.      Comments: Upper and lower extrems are wnl--inspection and palpation.   Strength is normal and symmetric.   Lymphadenopathy:      Cervical: No cervical adenopathy.   Skin:     General: Skin is warm and dry.      Findings: No bruising, erythema, lesion or rash.      Comments: Intact   Neurological:      General: No focal deficit present.      Mental Status: She is alert and oriented to person, place, and time.      Sensory: Sensation is intact.      Motor: Motor function is intact.      Deep Tendon Reflexes: Reflexes are normal and symmetric.   Psychiatric:         Mood and Affect: Mood and affect normal.         Speech: Speech normal.         Behavior: Behavior normal. Behavior is cooperative.           Orders:  No orders of the defined types were placed in this encounter.        Future Appointments:  No future appointments.          Assessment/Plan:  Diagnoses and all orders for this visit:  Encounter for Medicare annual wellness exam  -     CBC and Auto Differential; Future  -     Comprehensive Metabolic Panel; Future  -     Lipid Panel; Future  -     Urinalysis with Reflex Microscopic; Future  -     Vitamin D 25-Hydroxy,Total (for eval of Vitamin D levels); Future  -     TSH with reflex to Free T4 if abnormal; Future  -     Hemoglobin A1C; Future  Annual  physical exam  -     CBC and Auto Differential; Future  -     Urinalysis with Reflex Microscopic; Future  -     Vitamin D 25-Hydroxy,Total (for eval of Vitamin D levels); Future  -     TSH with reflex to Free T4 if abnormal; Future  -     Hemoglobin A1C; Future  Essential (primary) hypertension  -     atenolol (Tenormin) 50 mg tablet; Take 1 tablet (50 mg) by mouth once daily.  -     dilTIAZem CD (Cardizem CD) 300 mg 24 hr capsule; Take 1 capsule (300 mg) by mouth once daily.  -     hydroCHLOROthiazide (HYDRODiuril) 25 mg tablet; Take 1 tablet (25 mg) by mouth once daily.  Lichen planus  -     clobetasol (Temovate) 0.05 % cream; Thin layer to affected area daily as needed  Mixed hyperlipidemia  -     Lipid Panel; Future  Benign paroxysmal positional vertigo, unspecified laterality  -     Referral to Physical Therapy; Future  Anemia, unspecified type  -     CBC and Auto Differential; Future  Vitamin D deficiency  -     Vitamin D 25-Hydroxy,Total (for eval of Vitamin D levels); Future  Screening for thyroid disorder  -     TSH with reflex to Free T4 if abnormal; Future  Screening for diabetes mellitus  -     Hemoglobin A1C; Future  Hyperglycemia  -     Hemoglobin A1C; Future  Encounter for screening mammogram for malignant neoplasm of breast  -     BI mammo bilateral screening tomosynthesis; Future  Dermatitis  -     desoximetasone (Topicort) 0.05 % cream; Apply 1 application. topically 2 times a day.  Gout, unspecified cause, unspecified chronicity, unspecified site  -     allopurinol (Zyloprim) 100 mg tablet; Take 1 tablet (100 mg) by mouth once daily.  Rash and other nonspecific skin eruption  -     triamcinolone (Kenalog) 0.1 % cream; Thin coat twice daily to affected areas on  for 3-4 weeks, then weekends only. Repeat every few months for flares.      Elliot Reyes MD

## 2025-01-11 LAB
EST. AVERAGE GLUCOSE BLD GHB EST-MCNC: 82 MG/DL
HBA1C MFR BLD: 4.5 %

## 2025-01-22 PROBLEM — H81.10 BENIGN PAROXYSMAL POSITIONAL VERTIGO: Status: ACTIVE | Noted: 2025-01-22

## 2025-01-22 PROBLEM — F10.20 UNCOMPLICATED ALCOHOL DEPENDENCE (MULTI): Status: ACTIVE | Noted: 2025-01-22

## 2025-01-22 PROBLEM — E66.01 MORBID OBESITY DUE TO EXCESS CALORIES (MULTI): Status: ACTIVE | Noted: 2025-01-22

## 2025-01-22 PROBLEM — Z12.31 ENCOUNTER FOR SCREENING MAMMOGRAM FOR MALIGNANT NEOPLASM OF BREAST: Status: ACTIVE | Noted: 2025-01-22

## 2025-01-22 PROBLEM — L43.9 LICHEN PLANUS: Status: ACTIVE | Noted: 2025-01-22

## 2025-01-22 PROBLEM — Z00.00 ANNUAL PHYSICAL EXAM: Status: ACTIVE | Noted: 2025-01-22

## 2025-01-22 PROBLEM — M10.9 GOUT: Status: ACTIVE | Noted: 2025-01-22

## 2025-01-28 ENCOUNTER — CLINICAL SUPPORT (OUTPATIENT)
Dept: PHYSICAL THERAPY | Facility: CLINIC | Age: 75
End: 2025-01-28
Payer: MEDICARE

## 2025-01-28 DIAGNOSIS — H81.10 BENIGN PAROXYSMAL POSITIONAL VERTIGO, UNSPECIFIED LATERALITY: Primary | ICD-10-CM

## 2025-01-28 PROCEDURE — 97140 MANUAL THERAPY 1/> REGIONS: CPT | Mod: GP | Performed by: PHYSICAL THERAPIST

## 2025-01-28 PROCEDURE — 97161 PT EVAL LOW COMPLEX 20 MIN: CPT | Mod: GP | Performed by: PHYSICAL THERAPIST

## 2025-01-28 ASSESSMENT — PATIENT HEALTH QUESTIONNAIRE - PHQ9
SUM OF ALL RESPONSES TO PHQ9 QUESTIONS 1 AND 2: 0
1. LITTLE INTEREST OR PLEASURE IN DOING THINGS: NOT AT ALL
2. FEELING DOWN, DEPRESSED OR HOPELESS: NOT AT ALL

## 2025-01-28 ASSESSMENT — ENCOUNTER SYMPTOMS
LOSS OF SENSATION IN FEET: 0
DEPRESSION: 0
OCCASIONAL FEELINGS OF UNSTEADINESS: 1

## 2025-01-28 ASSESSMENT — COLUMBIA-SUICIDE SEVERITY RATING SCALE - C-SSRS: 1. IN THE PAST MONTH, HAVE YOU WISHED YOU WERE DEAD OR WISHED YOU COULD GO TO SLEEP AND NOT WAKE UP?: NO

## 2025-01-28 NOTE — PROGRESS NOTES
Physical Therapy Evaluation    Patient Name: Brie Trevino  MRN: [id number]  Today's Date: 1/28/2025  Visit: 1/MN  Referred by: Dr. Elliot Reyes  Insurance: [employer]na Medicare  DOS/DOI: 12/25/24  Time Calculation  Start Time: 1508  Stop Time: 1600  Time Calculation (min): 52 min  Diagnosis:   1. Benign paroxysmal positional vertigo, unspecified laterality  Referral to Physical Therapy    Follow Up In Physical Therapy        Problem List Items Addressed This Visit             ICD-10-CM    Benign paroxysmal positional vertigo - Primary H81.10    Relevant Orders    Follow Up In Physical Therapy     PT Evaluation Time Entry  PT Evaluation (Low) Time Entry: 40  PT Therapeutic Procedures Time Entry  Manual Therapy Time Entry: 10                   PRECAUTIONS:   Fall Risk: Low    Personal factors impacting care: none    SUBJECTIVE:   Patient awoke on [place] morning and felt lightheaded and dizzy. No previous vertigo symptoms. One week later developed L ear fullness and feels like swimmer's ear. States symptoms are gradually improving and are less frequent and intense. Has not had any treatment and not on meds for this.   Pain: N/A     Home Living: Lives by herself in her 2 story home, laundry in basement     Prior level of function: Retired  for 35 years in [place], goes to [employer] 3 x week, enjoys reading and Candy Crush     Functional soria's- has been off of Silver Sneakers, non reciprocal gait on stairs, slow and cautious and relies on hand rail, slow and tentative with bed mobility    OBJECTIVE:  Oculomotor Exam:    VOR horizontal- WNL's    VOR vertical- WNL's     Smooth pursuits- WNL's    Saccades- none spontaneously    NPC (Conversion) <6 cm is normal- WNL's    VSR Vestibular spinal reflex (head w/ eye movement)- WNL's    Head thrust - NT    Head shake test for nystagmus -NT    Cervical AROM: mild cervical pain with L cervical SB  Flexion WNL's  Extension WNL's  SB R moderate restriction  SB L  moderate restriction  ROT R WNL's  ROT L WNL's    Palpation:  No significant tenderness on palpation    Balance:  Static Standing balance- Static stand Balance is WNL's x 4 directions    Romberg- NT    Tandem - NT    SLS R   5 sec  SLS L  4 sec    Gait: WNL's    Motion Sensitivity:  R sidly test +   L sidly test  -    Mariya Hallpike:   R  +   L-  Roll test :  R   Neg     L Neg  Outcome Measure:  Other Measures  Dizziness Handicap Inventory: 18    TREATMENT:  - Manual Therapy: Canalith repositioning for R posterior canal     PATIENT EDUCATION: Reviewed precautions post canalith repositioning, sleep recumbent x 24 hours  Outpatient Education  Individual(s) Educated: Patient  Education Provided: Anatomy, Home Safety  Risk and Benefits Discussed with Patient/Caregiver/Other: yes  Patient/Caregiver Demonstrated Understanding: yes  Plan of Care Discussed and Agreed Upon: yes  Patient Response to Education: Patient/Caregiver Verbalized Understanding of Information    ASSESSMENT:  Patient is 73 yo female with one month history of sudden acute onset of vertigo. Symptoms have gradually improved but remain present and limit her from performing bed mobility and transfers, managing stairs and participating in Silver Sneakers. Patient presents with + signs and symptoms of R posterior canal BPPV and would benefit from skilled PT services.    PT Assessment Results: Impaired balance (Vertigo)  Rehab Prognosis: Good  Evaluation/Treatment Tolerance: Patient tolerated treatment well  Strengths: Ability to acquire knowledge. Low complexity due to patient's clinical presentation being stable and uncomplicated by any significant comorbidities that may affect rehab tolerance and progression.     Clinical presentation:  Stable and/or uncomplicated characteristics,     PLAN:   Treatment/Interventions: Canalith repositioning, Education/ Instruction, Manual therapy, Neuromuscular re-education, Self care/ home management  PT Plan: Skilled PT  PT  "Frequency: 1 time per week  Duration: 12 weeks  Onset Date: 12/25/24  Certification Period Start Date: 01/28/25  Certification Period End Date: 04/28/25  Number of Treatments Authorized: MN  Rehab Potential: Good  Plan of Care Agreement: Patient      Patient goals: \"I want to stop spinning\"    GOALS:  Active       PT Problem       Patient will report 0-1/10 vertigo with motion sensitivity testing       Start:  01/28/25    Expected End:  04/28/25            Patient will return to normalized speed with bed mobility and transfers       Start:  01/28/25    Expected End:  04/28/25            Patient will perform SLS for 10 sec with improved stability to manage stairs        Start:  01/28/25    Expected End:  04/28/25            Patient will score < 4/100 on DHI, rate improvement at 90%       Start:  01/28/25    Expected End:  04/28/25            Patient will return to her Silver Sneakers without restriction       Start:  01/28/25    Expected End:  04/28/25              "

## 2025-01-28 NOTE — LETTER
January 28, 2025    Jasmine Marie, PT  [address] [facility], [address] [place] [address]    Patient: Brie Trevino   YOB: 1950   Date of Visit: 1/28/2025       Dear Elliot Reyes MD  [address] [place] [address] [place] [address]    The attached plan of care is being sent to you because your patient’s medical reimbursement requires that you certify the plan of care. Your signature is required to allow uninterrupted insurance coverage.      You may indicate your approval by signing below and faxing this form back to us at Dept Fax: [phone].    Please call Dept: [phone] with any questions or concerns.    Thank you for this referral,        Jasmine Marie, PT  [facility] [address] [facility]  [address] [facility] [address]    Payer: Payor: [employer] MEDICARE / Plan: [employer]IC[employer] / Product Type: *No Product type* /                                                                         Date:     Dear Jasmine Marie, PT,     Re: Ms. Brie Trevino, MRN:[id number]    I certify that I have reviewed the attached plan of care and it is medically necessary for Ms. Brie Trevino (1950) who is under my care.          ______________________________________                    _________________  Provider name and credentials                                           Date and time                                                                                           Plan of Care 1/28/25   Effective from: 1/28/2025  Effective to: 4/28/2025    Plan ID: [id number]            Participants as of Finalize on 1/28/2025    Name Type Comments Contact Info    Elliot Reyes MD PCP - [employer]age PCP  [phone]    Jasmine Marie PT Physical Therapist  [phone]       Last Plan Note     Author: Jasmine Marie PT Status: Sign when Signing Visit Last edited: 1/28/2025  3:00 PM       Physical Therapy Evaluation    Patient Name: Brie  Belinda  MRN: [id number]  Today's Date: 1/28/2025  Visit: 1/MN  Referred by: Dr. Elliot Reyes  Insurance: [employer]na Medicare  DOS/DOI: 12/25/24  Time Calculation  Start Time: 1508  Stop Time: 1600  Time Calculation (min): 52 min  Diagnosis:   1. Benign paroxysmal positional vertigo, unspecified laterality  Referral to Physical Therapy    Follow Up In Physical Therapy        Problem List Items Addressed This Visit             ICD-10-CM    Benign paroxysmal positional vertigo - Primary H81.10    Relevant Orders    Follow Up In Physical Therapy     PT Evaluation Time Entry  PT Evaluation (Low) Time Entry: 40  PT Therapeutic Procedures Time Entry  Manual Therapy Time Entry: 10                   PRECAUTIONS:   Fall Risk: Low    Personal factors impacting care: none    SUBJECTIVE:   Patient awoke on [place] morning and felt lightheaded and dizzy. No previous vertigo symptoms. One week later developed L ear fullness and feels like swimmer's ear. States symptoms are gradually improving and are less frequent and intense. Has not had any treatment and not on meds for this.   Pain: N/A     Home Living: Lives by herself in her 2 story home, laundry in basement     Prior level of function: Retired  for 35 years in [place], goes to [employer] 3 x week, enjoys reading and Candy [employer]     Functional soria's- has been off of Silver Sneakers, non reciprocal gait on stairs, slow and cautious and relies on hand rail, slow and tentative with bed mobility    OBJECTIVE:  Oculomotor Exam:    VOR horizontal- WNL's    VOR vertical- WNL's     Smooth pursuits- WNL's    Saccades- none spontaneously    NPC (Conversion) <6 cm is normal- WNL's    VSR Vestibular spinal reflex (head w/ eye movement)- WNL's    Head thrust - NT    Head shake test for nystagmus -NT    Cervical AROM: mild cervical pain with L cervical SB  Flexion WNL's  Extension WNL's  SB R moderate restriction  SB L moderate restriction  ROT R WNL's  ROT L  WNL's    Palpation:  No significant tenderness on palpation    Balance:  Static Standing balance- Static stand Balance is WNL's x 4 directions    Romberg- NT    Tandem - NT    SLS R   5 sec  SLS L  4 sec    Gait: WNL's    Motion Sensitivity:  R sidly test +   L sidly test  -    Bradley Hallpike:   R  +   L-  Roll test :  R   Neg     L Neg  Outcome Measure:  Other Measures  Dizziness Handicap Inventory: 18    TREATMENT:  - Manual Therapy: Canalith repositioning for R posterior canal     PATIENT EDUCATION: Reviewed precautions post canalith repositioning, sleep recumbent x 24 hours  Outpatient Education  Individual(s) Educated: Patient  Education Provided: Anatomy, Home Safety  Risk and Benefits Discussed with Patient/Caregiver/Other: yes  Patient/Caregiver Demonstrated Understanding: yes  Plan of Care Discussed and Agreed Upon: yes  Patient Response to Education: Patient/Caregiver Verbalized Understanding of Information    ASSESSMENT:  Patient is 73 yo female with one month history of sudden acute onset of vertigo. Symptoms have gradually improved but remain present and limit her from performing bed mobility and transfers, managing stairs and participating in Silver Sneakers. Patient presents with + signs and symptoms of R posterior canal BPPV and would benefit from skilled PT services.    PT Assessment Results: Impaired balance (Vertigo)  Rehab Prognosis: Good  Evaluation/Treatment Tolerance: Patient tolerated treatment well  Strengths: Ability to acquire knowledge. Low complexity due to patient's clinical presentation being stable and uncomplicated by any significant comorbidities that may affect rehab tolerance and progression.     Clinical presentation:  Stable and/or uncomplicated characteristics,     PLAN:   Treatment/Interventions: Canalith repositioning, Education/ Instruction, Manual therapy, Neuromuscular re-education, Self care/ home management  PT Plan: Skilled PT  PT Frequency: 1 time per week  Duration: 12  "weeks  Onset Date: 12/25/24  Certification Period Start Date: 01/28/25  Certification Period End Date: 04/28/25  Number of Treatments Authorized: MN  Rehab Potential: Good  Plan of Care Agreement: Patient      Patient goals: \"I want to stop spinning\"    GOALS:  Active       PT Problem       Patient will report 0-1/10 vertigo with motion sensitivity testing       Start:  01/28/25    Expected End:  04/28/25            Patient will return to normalized speed with bed mobility and transfers       Start:  01/28/25    Expected End:  04/28/25            Patient will perform SLS for 10 sec with improved stability to manage stairs        Start:  01/28/25    Expected End:  04/28/25            Patient will score < 4/100 on DHI, rate improvement at 90%       Start:  01/28/25    Expected End:  04/28/25            Patient will return to her Silver Sneakers without restriction       Start:  01/28/25    Expected End:  04/28/25                   Current Participants as of 1/28/2025    Name Type Comments Contact Info    Elliot Reyes MD PCP - [employer]age PCP  [phone]    Signature pending    Jasmine Marie PT Physical Therapist  [phone]    Signature pending      "

## 2025-02-05 ENCOUNTER — TREATMENT (OUTPATIENT)
Dept: PHYSICAL THERAPY | Facility: CLINIC | Age: 75
End: 2025-02-05
Payer: MEDICARE

## 2025-02-05 DIAGNOSIS — H81.10 BENIGN PAROXYSMAL POSITIONAL VERTIGO, UNSPECIFIED LATERALITY: ICD-10-CM

## 2025-02-05 PROCEDURE — 97140 MANUAL THERAPY 1/> REGIONS: CPT | Mod: GP | Performed by: PHYSICAL THERAPIST

## 2025-02-05 PROCEDURE — 97112 NEUROMUSCULAR REEDUCATION: CPT | Mod: GP | Performed by: PHYSICAL THERAPIST

## 2025-02-05 NOTE — PROGRESS NOTES
"  Physical Therapy Treatment    Patient Name: Brie Trevino  MRN: [id number]  Today's Date: 2/5/2025  Visit 2 /MN  Referred by: Dr. Elliot Reyes  Insurance: [employer] Medicare  DOS/DOI: 12/25/24  Time Calculation  Start Time: 1010  Stop Time: 1050  Time Calculation (min): 40 min  1. Benign paroxysmal positional vertigo, unspecified laterality  Follow Up In Physical Therapy        Problem List Items Addressed This Visit             ICD-10-CM    Benign paroxysmal positional vertigo H81.10        PT Therapeutic Procedures Time Entry  Manual Therapy Time Entry: 10  Neuromuscular Re-Education Time Entry: 15                   PRECAUTIONS:  Fall Risk: Low    SUBJECTIVE:  Patient reports she is no longer experiencing the spinning like she was. \"You are my brijesh.\" States she followed the given instructions without deviation for one week.  Pain level: N/A  Compliant with HEP? N/A    OBJECTIVE:  +R sidly test  + R [place] Hallpike    Outcome Measure:  Other Measures  Dizziness Handicap Inventory: 6 vs. 18 initially    TREATMENT:  - Manual Therapy: Parnes maneuver for R posterior canal issue    - Neuromuscular Re-education: Positional testing for symptoms response    ASSESSMENT: Patient had mild provocation of symptoms today with positioning testing, but had significant vertigo on return to sitting post correction. Patient wishes to continue without further care since she is doing so much better at this time and will call if she experiences a flare up.      EDUCATION: Reviewed precautions post canalith repositioning again with patient, she still has the sheet.    PLAN: Hold patient chart open in the event she may have a flare up. Patient was given Canelo Davidson's info at [place] in the event that she would experience vertigo symptoms after 04/04/25 through 04/28/25 (end of POC)  Active       PT Problem       Patient will report 0-1/10 vertigo with motion sensitivity testing (Progressing)       Start:  01/28/25    Expected End:  04/28/25   "          Patient will return to normalized speed with bed mobility and transfers (Progressing)       Start:  01/28/25    Expected End:  04/28/25            Patient will perform SLS for 10 sec with improved stability to manage stairs  (Progressing)       Start:  01/28/25    Expected End:  04/28/25            Patient will score < 4/100 on DHI, rate improvement at 90% (Progressing)       Start:  01/28/25    Expected End:  04/28/25            Patient will return to her Silver Sneakers without restriction (Progressing)       Start:  01/28/25    Expected End:  04/28/25

## 2025-03-04 ENCOUNTER — OFFICE VISIT (OUTPATIENT)
Dept: PRIMARY CARE | Facility: CLINIC | Age: 75
End: 2025-03-04
Payer: MEDICARE

## 2025-03-04 VITALS
HEART RATE: 62 BPM | SYSTOLIC BLOOD PRESSURE: 146 MMHG | WEIGHT: 174 LBS | BODY MASS INDEX: 28.08 KG/M2 | RESPIRATION RATE: 16 BRPM | DIASTOLIC BLOOD PRESSURE: 84 MMHG | TEMPERATURE: 96.5 F

## 2025-03-04 DIAGNOSIS — E87.1 HYPONATREMIA: ICD-10-CM

## 2025-03-04 DIAGNOSIS — F10.10 ALCOHOL ABUSE: ICD-10-CM

## 2025-03-04 DIAGNOSIS — R20.2 TINGLING OF SKIN: Primary | ICD-10-CM

## 2025-03-04 PROCEDURE — 1159F MED LIST DOCD IN RCRD: CPT | Performed by: NURSE PRACTITIONER

## 2025-03-04 PROCEDURE — 3077F SYST BP >= 140 MM HG: CPT | Performed by: NURSE PRACTITIONER

## 2025-03-04 PROCEDURE — 1160F RVW MEDS BY RX/DR IN RCRD: CPT | Performed by: NURSE PRACTITIONER

## 2025-03-04 PROCEDURE — 3079F DIAST BP 80-89 MM HG: CPT | Performed by: NURSE PRACTITIONER

## 2025-03-04 PROCEDURE — 99213 OFFICE O/P EST LOW 20 MIN: CPT | Performed by: NURSE PRACTITIONER

## 2025-03-04 ASSESSMENT — ENCOUNTER SYMPTOMS
CONSTITUTIONAL NEGATIVE: 1
NEUROLOGICAL NEGATIVE: 1
RESPIRATORY NEGATIVE: 1
CARDIOVASCULAR NEGATIVE: 1

## 2025-03-04 NOTE — PROGRESS NOTES
Subjective   Patient ID: Brie Trevino is a 75 y.o. female.    HPI  Acute visit coverage for pcp dr perales pt c/o 'tingling in fingers and toes'. A few weeks now. No injuries. Pt has had a recent aic that was normal. Full lab panel w/u was completed see 1/10/25 labs. Of note, sodium was low, slight low wbc,  she states she has just quit drinking alcohol she had problems with it (vodka) as of last week. She does not wish to have any medication to help her continue with her alcohol abstinence. She does not want any type of counseling. She says her hands and feet do not go numb rather just the tips of all will tingle. She thinks it may be d/t stopping drinking. We will start with a check her electrolytes/labs and she will f/u with her pcp in 2 weeks or call if any needs arise sooner.  She has had no other pain.      Review of Systems   Constitutional: Negative.    Respiratory: Negative.     Cardiovascular: Negative.    Neurological: Negative.    All other systems reviewed and are negative.      Objective   Physical Exam  Vitals and nursing note reviewed.   Constitutional:       Appearance: Normal appearance.   HENT:      Head: Normocephalic.   Eyes:      Pupils: Pupils are equal, round, and reactive to light.   Cardiovascular:      Rate and Rhythm: Normal rate and regular rhythm.      Heart sounds: Normal heart sounds.   Pulmonary:      Effort: Pulmonary effort is normal.      Breath sounds: Normal breath sounds.   Skin:     General: Skin is warm and dry.   Neurological:      General: No focal deficit present.      Mental Status: She is alert and oriented to person, place, and time. Mental status is at baseline.   Psychiatric:         Mood and Affect: Mood normal.         Behavior: Behavior normal.         Thought Content: Thought content normal.         Judgment: Judgment normal.            Problem List Items Addressed This Visit    None  Visit Diagnoses         Codes    Tingling of skin    -  Primary R20.2     Relevant Orders    CBC and Auto Differential    Magnesium    Vitamin B12    Folate    Hyponatremia     E87.1    Relevant Orders    CBC and Auto Differential    Comprehensive Metabolic Panel    Magnesium    Alcohol abuse     F10.10

## 2025-03-05 ENCOUNTER — TELEPHONE (OUTPATIENT)
Dept: PRIMARY CARE | Facility: CLINIC | Age: 75
End: 2025-03-05
Payer: MEDICARE

## 2025-03-05 DIAGNOSIS — M79.641 PAIN IN BOTH HANDS: ICD-10-CM

## 2025-03-05 DIAGNOSIS — E83.42 HYPOMAGNESEMIA: ICD-10-CM

## 2025-03-05 DIAGNOSIS — M79.642 PAIN IN BOTH HANDS: ICD-10-CM

## 2025-03-05 DIAGNOSIS — M79.642 PAIN IN BOTH HANDS: Primary | ICD-10-CM

## 2025-03-05 DIAGNOSIS — M79.641 PAIN IN BOTH HANDS: Primary | ICD-10-CM

## 2025-03-05 RX ORDER — MELOXICAM 7.5 MG/1
TABLET ORAL
Qty: 30 TABLET | Refills: 1 | Status: SHIPPED | OUTPATIENT
Start: 2025-03-05 | End: 2025-03-06 | Stop reason: WASHOUT

## 2025-03-05 RX ORDER — CALCIUM CARBONATE 300MG(750)
TABLET,CHEWABLE ORAL
Qty: 30 TABLET | Refills: 1 | Status: SHIPPED | OUTPATIENT
Start: 2025-03-05

## 2025-03-05 RX ORDER — FOLIC ACID 1 MG/1
TABLET ORAL
Qty: 30 TABLET | Refills: 1 | Status: SHIPPED | OUTPATIENT
Start: 2025-03-05 | End: 2025-03-06 | Stop reason: SDUPTHER

## 2025-03-06 ENCOUNTER — TELEPHONE (OUTPATIENT)
Dept: PRIMARY CARE | Facility: CLINIC | Age: 75
End: 2025-03-06

## 2025-03-06 ENCOUNTER — OFFICE VISIT (OUTPATIENT)
Dept: PRIMARY CARE | Facility: CLINIC | Age: 75
End: 2025-03-06
Payer: MEDICARE

## 2025-03-06 VITALS
DIASTOLIC BLOOD PRESSURE: 79 MMHG | HEART RATE: 63 BPM | BODY MASS INDEX: 27.48 KG/M2 | SYSTOLIC BLOOD PRESSURE: 129 MMHG | WEIGHT: 171 LBS | HEIGHT: 66 IN | RESPIRATION RATE: 16 BRPM

## 2025-03-06 DIAGNOSIS — M79.641 PAIN IN BOTH HANDS: ICD-10-CM

## 2025-03-06 DIAGNOSIS — E83.42 HYPOMAGNESEMIA: ICD-10-CM

## 2025-03-06 DIAGNOSIS — M79.642 PAIN IN BOTH HANDS: ICD-10-CM

## 2025-03-06 DIAGNOSIS — B02.9 HERPES ZOSTER WITHOUT COMPLICATION: Primary | ICD-10-CM

## 2025-03-06 PROCEDURE — 1036F TOBACCO NON-USER: CPT | Performed by: NURSE PRACTITIONER

## 2025-03-06 PROCEDURE — 99213 OFFICE O/P EST LOW 20 MIN: CPT | Performed by: NURSE PRACTITIONER

## 2025-03-06 PROCEDURE — 3008F BODY MASS INDEX DOCD: CPT | Performed by: NURSE PRACTITIONER

## 2025-03-06 PROCEDURE — 3074F SYST BP LT 130 MM HG: CPT | Performed by: NURSE PRACTITIONER

## 2025-03-06 PROCEDURE — 1159F MED LIST DOCD IN RCRD: CPT | Performed by: NURSE PRACTITIONER

## 2025-03-06 PROCEDURE — 3078F DIAST BP <80 MM HG: CPT | Performed by: NURSE PRACTITIONER

## 2025-03-06 RX ORDER — VALACYCLOVIR HYDROCHLORIDE 500 MG/1
500 TABLET, FILM COATED ORAL 2 TIMES DAILY
Qty: 14 TABLET | Refills: 0 | Status: SHIPPED | OUTPATIENT
Start: 2025-03-06 | End: 2025-03-13

## 2025-03-06 RX ORDER — FOLIC ACID 1 MG/1
TABLET ORAL
Qty: 30 TABLET | Refills: 1 | Status: SHIPPED | OUTPATIENT
Start: 2025-03-06

## 2025-03-06 RX ORDER — HYDROXYZINE HYDROCHLORIDE 25 MG/1
25 TABLET, FILM COATED ORAL 2 TIMES DAILY
Qty: 60 TABLET | Refills: 0 | Status: SHIPPED | OUTPATIENT
Start: 2025-03-06 | End: 2025-04-05

## 2025-03-06 NOTE — PROGRESS NOTES
Subjective   Patient ID: Brie Trevino is a 75 y.o. female.    HPI  Just saw pt  New onset rash within last 48hrs  Localized on the left neck   No new shampoos/lotions/sprays/etc  Very Itchy/pinching. Does not burn. No pain    We discussed her labs - replacing magnesium, folate. She is still not drinking.    I have added LASHAWN/RH to her current labs - her hands look very arthritic.  She states the mobic has helped the hand pain    Review of Systems  Above. Also denies fever/chills  Objective   Physical Exam  Vitals and nursing note reviewed.   Constitutional:       Appearance: Normal appearance.   HENT:      Head: Normocephalic.   Eyes:      Pupils: Pupils are equal, round, and reactive to light.   Neck:      Comments: Left vesicular shingles rash on the neck./chest.  Cardiovascular:      Rate and Rhythm: Normal rate and regular rhythm.      Heart sounds: Normal heart sounds.   Pulmonary:      Effort: Pulmonary effort is normal.      Breath sounds: Normal breath sounds.   Musculoskeletal:      Cervical back: Normal range of motion and neck supple. Tenderness present.   Skin:     General: Skin is warm and dry.   Neurological:      General: No focal deficit present.      Mental Status: She is alert and oriented to person, place, and time. Mental status is at baseline.   Psychiatric:         Mood and Affect: Mood normal.         Behavior: Behavior normal.         Thought Content: Thought content normal.         Judgment: Judgment normal.         Assessment/Plan   Diagnoses and all orders for this visit:  Herpes zoster without complication  -     valACYclovir (Valtrex) 500 mg tablet; Take 1 tablet (500 mg) by mouth 2 times a day for 7 days.  -     hydrOXYzine HCL (Atarax) 25 mg tablet; Take 1 tablet (25 mg) by mouth 2 times a day. Prn for itching  Pain in both hands  -     folic acid (Folvite) 1 mg tablet; One po daily. Please lead pt to otc if not covered.  Hypomagnesemia  -     folic acid (Folvite) 1 mg tablet; One po  daily. Please lead pt to otc if not covered.  Other orders  -     Follow Up In Primary Care - Established; Future

## 2025-03-06 NOTE — TELEPHONE ENCOUNTER
----- Message from Laura Seaver sent at 3/6/2025 10:48 AM EST -----  Regarding: additional lab  Hi bethany leyva please put on additional lab testing for richard - add an milind, rheum factor, and lupus. Ty lsnp

## 2025-03-06 NOTE — TELEPHONE ENCOUNTER
Called [facility] and gave verbal order to add on labs:  Lupus Panel 2 (includes LASHAWN) and Rheum Factor - added on to labs drawn 03/04/25

## 2025-03-07 LAB
ALBUMIN SERPL-MCNC: 4 G/DL (ref 3.6–5.1)
ALP SERPL-CCNC: 55 U/L (ref 37–153)
ALT SERPL-CCNC: 16 U/L (ref 6–29)
ANA SER QL IF: NORMAL
ANION GAP SERPL CALCULATED.4IONS-SCNC: 10 MMOL/L (CALC) (ref 7–17)
AST SERPL-CCNC: 80 U/L (ref 10–35)
BASOPHILS # BLD AUTO: 38 CELLS/UL (ref 0–200)
BASOPHILS NFR BLD AUTO: 1 %
BILIRUB SERPL-MCNC: 0.9 MG/DL (ref 0.2–1.2)
BUN SERPL-MCNC: 10 MG/DL (ref 7–25)
CALCIUM SERPL-MCNC: 10.2 MG/DL (ref 8.6–10.4)
CHLORIDE SERPL-SCNC: 96 MMOL/L (ref 98–110)
CO2 SERPL-SCNC: 29 MMOL/L (ref 20–32)
CREAT SERPL-MCNC: 0.95 MG/DL (ref 0.6–1)
DSDNA AB SER-ACNC: NORMAL [IU]/ML
EGFRCR SERPLBLD CKD-EPI 2021: 63 ML/MIN/1.73M2
ENA SCL70 AB SER IA-ACNC: NORMAL
ENA SM AB SER IA-ACNC: NORMAL
ENA SM+RNP AB SER IA-ACNC: NORMAL
ENA SS-A AB SER IA-ACNC: NORMAL
ENA SS-B AB SER IA-ACNC: NORMAL
EOSINOPHIL # BLD AUTO: 179 CELLS/UL (ref 15–500)
EOSINOPHIL NFR BLD AUTO: 4.7 %
ERYTHROCYTE [DISTWIDTH] IN BLOOD BY AUTOMATED COUNT: 13.5 % (ref 11–15)
FOLATE SERPL-MCNC: 4.1 NG/ML
GLUCOSE SERPL-MCNC: 103 MG/DL (ref 65–99)
HCT VFR BLD AUTO: 36.2 % (ref 35–45)
HGB BLD-MCNC: 12.1 G/DL (ref 11.7–15.5)
LYMPHOCYTES # BLD AUTO: 1068 CELLS/UL (ref 850–3900)
LYMPHOCYTES NFR BLD AUTO: 28.1 %
MAGNESIUM SERPL-MCNC: 1.2 MG/DL (ref 1.5–2.5)
MCH RBC QN AUTO: 33.3 PG (ref 27–33)
MCHC RBC AUTO-ENTMCNC: 33.4 G/DL (ref 32–36)
MCV RBC AUTO: 99.7 FL (ref 80–100)
MONOCYTES # BLD AUTO: 540 CELLS/UL (ref 200–950)
MONOCYTES NFR BLD AUTO: 14.2 %
NEUTROPHILS # BLD AUTO: 1976 CELLS/UL (ref 1500–7800)
NEUTROPHILS NFR BLD AUTO: 52 %
PLATELET # BLD AUTO: 301 THOUSAND/UL (ref 140–400)
PMV BLD REES-ECKER: 11.1 FL (ref 7.5–12.5)
POTASSIUM SERPL-SCNC: 3.8 MMOL/L (ref 3.5–5.3)
PROT SERPL-MCNC: 7.1 G/DL (ref 6.1–8.1)
RBC # BLD AUTO: 3.63 MILLION/UL (ref 3.8–5.1)
RHEUMATOID FACT SERPL-ACNC: <10 IU/ML
SODIUM SERPL-SCNC: 135 MMOL/L (ref 135–146)
URATE SERPL-MCNC: 5.8 MG/DL (ref 2.5–7)
VIT B12 SERPL-MCNC: 457 PG/ML (ref 200–1100)
WBC # BLD AUTO: 3.8 THOUSAND/UL (ref 3.8–10.8)

## 2025-03-10 RX ORDER — FOLIC ACID 1 MG/1
TABLET ORAL
Qty: 30 TABLET | Refills: 1 | OUTPATIENT
Start: 2025-03-10

## 2025-03-14 LAB
ALBUMIN SERPL-MCNC: 4 G/DL (ref 3.6–5.1)
ALP SERPL-CCNC: 55 U/L (ref 37–153)
ALT SERPL-CCNC: 16 U/L (ref 6–29)
ANA SER QL IF: NEGATIVE
ANION GAP SERPL CALCULATED.4IONS-SCNC: 10 MMOL/L (CALC) (ref 7–17)
AST SERPL-CCNC: 80 U/L (ref 10–35)
BASOPHILS # BLD AUTO: 38 CELLS/UL (ref 0–200)
BASOPHILS NFR BLD AUTO: 1 %
BILIRUB SERPL-MCNC: 0.9 MG/DL (ref 0.2–1.2)
BUN SERPL-MCNC: 10 MG/DL (ref 7–25)
CALCIUM SERPL-MCNC: 10.2 MG/DL (ref 8.6–10.4)
CHLORIDE SERPL-SCNC: 96 MMOL/L (ref 98–110)
CO2 SERPL-SCNC: 29 MMOL/L (ref 20–32)
CREAT SERPL-MCNC: 0.95 MG/DL (ref 0.6–1)
DSDNA AB SER-ACNC: <1 IU/ML
EGFRCR SERPLBLD CKD-EPI 2021: 63 ML/MIN/1.73M2
ENA SCL70 AB SER IA-ACNC: NORMAL AI
ENA SM AB SER IA-ACNC: NORMAL AI
ENA SM+RNP AB SER IA-ACNC: NORMAL AI
ENA SS-A AB SER IA-ACNC: NORMAL AI
ENA SS-B AB SER IA-ACNC: NORMAL AI
EOSINOPHIL # BLD AUTO: 179 CELLS/UL (ref 15–500)
EOSINOPHIL NFR BLD AUTO: 4.7 %
ERYTHROCYTE [DISTWIDTH] IN BLOOD BY AUTOMATED COUNT: 13.5 % (ref 11–15)
FOLATE SERPL-MCNC: 4.1 NG/ML
GLUCOSE SERPL-MCNC: 103 MG/DL (ref 65–99)
HCT VFR BLD AUTO: 36.2 % (ref 35–45)
HGB BLD-MCNC: 12.1 G/DL (ref 11.7–15.5)
LYMPHOCYTES # BLD AUTO: 1068 CELLS/UL (ref 850–3900)
LYMPHOCYTES NFR BLD AUTO: 28.1 %
MAGNESIUM SERPL-MCNC: 1.2 MG/DL (ref 1.5–2.5)
MCH RBC QN AUTO: 33.3 PG (ref 27–33)
MCHC RBC AUTO-ENTMCNC: 33.4 G/DL (ref 32–36)
MCV RBC AUTO: 99.7 FL (ref 80–100)
MONOCYTES # BLD AUTO: 540 CELLS/UL (ref 200–950)
MONOCYTES NFR BLD AUTO: 14.2 %
NEUTROPHILS # BLD AUTO: 1976 CELLS/UL (ref 1500–7800)
NEUTROPHILS NFR BLD AUTO: 52 %
PLATELET # BLD AUTO: 301 THOUSAND/UL (ref 140–400)
PMV BLD REES-ECKER: 11.1 FL (ref 7.5–12.5)
POTASSIUM SERPL-SCNC: 3.8 MMOL/L (ref 3.5–5.3)
PROT SERPL-MCNC: 7.1 G/DL (ref 6.1–8.1)
RBC # BLD AUTO: 3.63 MILLION/UL (ref 3.8–5.1)
RHEUMATOID FACT SERPL-ACNC: <10 IU/ML
SODIUM SERPL-SCNC: 135 MMOL/L (ref 135–146)
URATE SERPL-MCNC: 5.8 MG/DL (ref 2.5–7)
VIT B12 SERPL-MCNC: 457 PG/ML (ref 200–1100)
WBC # BLD AUTO: 3.8 THOUSAND/UL (ref 3.8–10.8)

## 2025-03-26 ENCOUNTER — APPOINTMENT (OUTPATIENT)
Dept: PRIMARY CARE | Facility: CLINIC | Age: 75
End: 2025-03-26
Payer: MEDICARE

## 2025-03-26 VITALS
HEART RATE: 60 BPM | SYSTOLIC BLOOD PRESSURE: 150 MMHG | TEMPERATURE: 97.1 F | WEIGHT: 173 LBS | DIASTOLIC BLOOD PRESSURE: 80 MMHG | RESPIRATION RATE: 16 BRPM | BODY MASS INDEX: 27.92 KG/M2

## 2025-03-26 DIAGNOSIS — M15.0 PRIMARY OSTEOARTHRITIS INVOLVING MULTIPLE JOINTS: Primary | ICD-10-CM

## 2025-03-26 DIAGNOSIS — M79.2 NEUROPATHIC PAIN: ICD-10-CM

## 2025-03-26 PROCEDURE — 3079F DIAST BP 80-89 MM HG: CPT | Performed by: INTERNAL MEDICINE

## 2025-03-26 PROCEDURE — 1159F MED LIST DOCD IN RCRD: CPT | Performed by: INTERNAL MEDICINE

## 2025-03-26 PROCEDURE — 3077F SYST BP >= 140 MM HG: CPT | Performed by: INTERNAL MEDICINE

## 2025-03-26 PROCEDURE — 99213 OFFICE O/P EST LOW 20 MIN: CPT | Performed by: INTERNAL MEDICINE

## 2025-03-26 PROCEDURE — G2211 COMPLEX E/M VISIT ADD ON: HCPCS | Performed by: INTERNAL MEDICINE

## 2025-03-26 RX ORDER — MELOXICAM 15 MG/1
15 TABLET ORAL DAILY
Qty: 90 TABLET | Refills: 1 | Status: SHIPPED | OUTPATIENT
Start: 2025-03-26

## 2025-03-26 RX ORDER — DULOXETIN HYDROCHLORIDE 30 MG/1
30 CAPSULE, DELAYED RELEASE ORAL 2 TIMES DAILY
Qty: 60 CAPSULE | Refills: 5 | Status: SHIPPED | OUTPATIENT
Start: 2025-03-26 | End: 2025-09-22

## 2025-03-26 NOTE — PROGRESS NOTES
"    Date of Visit: 03/26/2025     Chief Complaint:   Chief Complaint   Patient presents with    Follow-up     Pain in hands and feet        HPI:  HPI  Subjective:   Brie Trevino is a 75 y.o. female presents   C/o \"Nerve pain\"in hands and feet. All day.  Reports nmbness in finger tips and feet.    Worse at night when she is trying to sleep  Hx of heavy etoh use.     ROS:   Review of Systems   Neurological:  Positive for numbness.         Outpatient Medications:  Current Outpatient Medications   Medication Instructions    allopurinol (ZYLOPRIM) 100 mg, oral, Daily    ammonium lactate (Amlactin) 12 % cream To arms, legs, and trunk daily after shower.    atenolol (TENORMIN) 50 mg, oral, Daily    clobetasol (Temovate) 0.05 % cream Thin layer to affected area daily as needed    desoximetasone (Topicort) 0.05 % cream 1 application., Topical, 2 times daily    dilTIAZem CD (CARDIZEM CD) 300 mg, oral, Daily    estradiol (Estrace) 0.01 % (0.1 mg/gram) vaginal cream Insert into the vagina.    folic acid (Folvite) 1 mg tablet One po daily. Please lead pt to otc if not covered.    hydroCHLOROthiazide (HYDRODIURIL) 25 mg, oral, Daily    hydrOXYzine HCL (ATARAX) 25 mg, oral, 2 times daily, Prn for itching    magnesium oxide (Mag-Ox) 400 mg tablet One tablet bid x 5 days po then on tablet daily for low magnesium    triamcinolone (Kenalog) 0.1 % cream Thin coat twice daily to affected areas on  for 3-4 weeks, then weekends only. Repeat every few months for flares.       Allergies:  Allergies   Allergen Reactions    Lisinopril Swelling     Swelling on face       History reviewed. No pertinent past medical history.     Health Maintenance   Topic Date Due    Bone Density Scan  Never done    Hepatitis C Screening  Never done    Hepatitis A Vaccines (1 of 2 - Risk 2-dose series) Never done    Zoster Vaccines (1 of 2) Never done    Influenza Vaccine (1) Never done    COVID-19 Vaccine (1 - 2024-25 season) Never done    DTaP/Tdap/Td " Vaccines (2 - Td or Tdap) 01/07/2026    Mammogram  01/10/2026    Medicare Annual Wellness Visit (AWV)  01/11/2026    RSV High Risk: (Elderly (60+) or Pregnant Population) (1 - 1-dose 75+ series) 02/16/2026    Lipid Panel  01/10/2030    Colorectal Cancer Screening  11/15/2034    Pneumococcal Vaccine  Completed    HIB Vaccines  Aged Out    Hepatitis B Vaccines  Aged Out    IPV Vaccines  Aged Out    Meningococcal Vaccine  Aged Out    Rotavirus Vaccines  Aged Out    HPV Vaccines  Aged Out       History reviewed. No pertinent surgical history.    No family history on file.      Social History     Socioeconomic History    Marital status: Single   Tobacco Use    Smoking status: Never     Passive exposure: Never    Smokeless tobacco: Never          Vitals:  /80   Pulse 60   Temp 36.2 °C (97.1 °F) (Temporal)   Resp 16   Wt 78.5 kg (173 lb)   BMI 27.92 kg/m²   Wt Readings from Last 3 Encounters:   03/26/25 78.5 kg (173 lb)   03/06/25 77.6 kg (171 lb)   03/04/25 78.9 kg (174 lb)     BP Readings from Last 3 Encounters:   03/26/25 150/80   03/06/25 129/79   03/04/25 146/84        Physical Exam:  Physical Exam  Vitals reviewed.   Constitutional:       Appearance: Normal appearance.   HENT:      Head: Normocephalic and atraumatic.   Cardiovascular:      Rate and Rhythm: Normal rate and regular rhythm.      Heart sounds: Normal heart sounds, S1 normal and S2 normal. No murmur heard.  Pulmonary:      Effort: Pulmonary effort is normal.      Breath sounds: Normal breath sounds. No wheezing, rhonchi or rales.   Abdominal:      General: Bowel sounds are normal.      Palpations: Abdomen is soft.   Skin:     General: Skin is warm and dry.   Neurological:      General: No focal deficit present.      Mental Status: She is alert and oriented to person, place, and time.          Assessment/Plan   Diagnoses and all orders for this visit:  Primary osteoarthritis involving multiple joints  -     meloxicam (Mobic) 15 mg tablet; Take 1  tablet (15 mg) by mouth once daily.  Neuropathic pain  -     DULoxetine (Cymbalta) 30 mg DR capsule; Take 1 capsule (30 mg) by mouth 2 times a day.  -     EMG & nerve conduction; Future      Orders:  No orders of the defined types were placed in this encounter.       Followup Appts:  No future appointments.     Ncs   ____________________________________________________________  Elliot Reyes MD

## 2025-03-28 DIAGNOSIS — B02.9 HERPES ZOSTER WITHOUT COMPLICATION: ICD-10-CM

## 2025-03-28 DIAGNOSIS — M79.642 PAIN IN BOTH HANDS: ICD-10-CM

## 2025-03-28 DIAGNOSIS — E83.42 HYPOMAGNESEMIA: ICD-10-CM

## 2025-03-28 DIAGNOSIS — M79.641 PAIN IN BOTH HANDS: ICD-10-CM

## 2025-03-28 RX ORDER — HYDROXYZINE HYDROCHLORIDE 25 MG/1
25 TABLET, FILM COATED ORAL 2 TIMES DAILY
Qty: 180 TABLET | Refills: 0 | Status: SHIPPED | OUTPATIENT
Start: 2025-03-28 | End: 2025-09-24

## 2025-03-28 RX ORDER — FOLIC ACID 1 MG/1
TABLET ORAL
Qty: 90 TABLET | Refills: 1 | Status: SHIPPED | OUTPATIENT
Start: 2025-03-28

## 2025-03-28 ASSESSMENT — ENCOUNTER SYMPTOMS: NUMBNESS: 1

## 2025-04-01 ENCOUNTER — TELEPHONE (OUTPATIENT)
Dept: PRIMARY CARE | Facility: CLINIC | Age: 75
End: 2025-04-01
Payer: MEDICARE

## 2025-04-01 NOTE — TELEPHONE ENCOUNTER
Patient left voicemail states the cymbalta is making her dizzy and causing her to fall. Asking for something else called in that's similar

## 2025-04-06 PROBLEM — M54.30 SCIATICA: Status: ACTIVE | Noted: 2025-04-06

## 2025-04-06 PROBLEM — F10.10 ALCOHOL ABUSE: Status: ACTIVE | Noted: 2025-04-06

## 2025-04-06 ASSESSMENT — ENCOUNTER SYMPTOMS
NAUSEA: 0
VOMITING: 0
ABDOMINAL PAIN: 0
BLOOD IN STOOL: 0
ABDOMINAL DISTENTION: 0

## 2025-04-07 ENCOUNTER — TELEPHONE (OUTPATIENT)
Dept: PRIMARY CARE | Facility: CLINIC | Age: 75
End: 2025-04-07

## 2025-04-07 NOTE — TELEPHONE ENCOUNTER
States took Cymbalta for only 6 days and stayed at the every day dosage. Never advanced to bid due to dizziness. States she fell several time during that time and quit taking it.Still suffering with the neuropathy pain. Is there diff medication.

## 2025-04-10 ENCOUNTER — OFFICE VISIT (OUTPATIENT)
Dept: PRIMARY CARE | Facility: CLINIC | Age: 75
End: 2025-04-10
Payer: MEDICARE

## 2025-04-10 VITALS
WEIGHT: 168 LBS | SYSTOLIC BLOOD PRESSURE: 115 MMHG | DIASTOLIC BLOOD PRESSURE: 70 MMHG | OXYGEN SATURATION: 97 % | BODY MASS INDEX: 27.12 KG/M2 | TEMPERATURE: 97.1 F | HEART RATE: 61 BPM

## 2025-04-10 DIAGNOSIS — M79.2 NEUROPATHIC PAIN: Primary | ICD-10-CM

## 2025-04-10 DIAGNOSIS — R26.81 UNSTEADY GAIT: ICD-10-CM

## 2025-04-10 DIAGNOSIS — R53.1 WEAKNESS: ICD-10-CM

## 2025-04-10 DIAGNOSIS — M15.9 GENERALIZED OSTEOARTHROSIS, INVOLVING MULTIPLE SITES: ICD-10-CM

## 2025-04-10 PROCEDURE — 3078F DIAST BP <80 MM HG: CPT | Performed by: INTERNAL MEDICINE

## 2025-04-10 PROCEDURE — 99214 OFFICE O/P EST MOD 30 MIN: CPT | Performed by: INTERNAL MEDICINE

## 2025-04-10 PROCEDURE — 3074F SYST BP LT 130 MM HG: CPT | Performed by: INTERNAL MEDICINE

## 2025-04-10 PROCEDURE — 1036F TOBACCO NON-USER: CPT | Performed by: INTERNAL MEDICINE

## 2025-04-10 ASSESSMENT — PATIENT HEALTH QUESTIONNAIRE - PHQ9
1. LITTLE INTEREST OR PLEASURE IN DOING THINGS: NOT AT ALL
2. FEELING DOWN, DEPRESSED OR HOPELESS: NOT AT ALL
SUM OF ALL RESPONSES TO PHQ9 QUESTIONS 1 AND 2: 0

## 2025-04-10 NOTE — PROGRESS NOTES
Subjective   Patient ID: Brie Trevino is a 75 y.o. female who presents for Diabetic Neuropathy (Needs to discuss for new medication possible PT).    She has chronic tingling over both arms and legs. She tried duloxetine but could not tolerate due to side effects, made her dizzy.   She uses walker for ambulation, and reports several incidences of fall at home, getting weak, unsteady on her feet.  She is not on diabetic med and her A1C is normal. She reports arthritis of multiple joints.   Not a smoker, and alcohol use rarely. No prior spine xrays done per chart review.         Review of Systems  As per HPI  No neck or low back pain, prior back surgery  No urinary or bowel issues  No fever, chest pain, dyspnea, leg swelling  Up with walker  Objective   /70 (BP Location: Left arm, Patient Position: Sitting, BP Cuff Size: Adult)   Pulse 61   Temp 36.2 °C (97.1 °F) (Temporal)   Wt 76.2 kg (168 lb)   SpO2 97%   BMI 27.12 kg/m²     Physical Exam  Constitutional:       Appearance: Normal appearance.   HENT:      Right Ear: Tympanic membrane normal.      Nose: No congestion.      Mouth/Throat:      Mouth: Mucous membranes are moist.      Pharynx: Oropharynx is clear.   Cardiovascular:      Rate and Rhythm: Normal rate and regular rhythm.      Pulses: Normal pulses.      Heart sounds: Normal heart sounds. No murmur heard.  Pulmonary:      Effort: Pulmonary effort is normal.      Breath sounds: Normal breath sounds. No wheezing or rales.   Abdominal:      General: Abdomen is flat. Bowel sounds are normal.      Palpations: Abdomen is soft.      Hernia: No hernia is present.   Musculoskeletal:         General: No swelling or tenderness. Normal range of motion.      Cervical back: Normal range of motion and neck supple.      Right lower leg: No edema.      Left lower leg: No edema.   Skin:     General: Skin is warm and dry.      Capillary Refill: Capillary refill takes less than 2 seconds.      Findings: No rash.    Neurological:      General: No focal deficit present.      Mental Status: She is alert and oriented to person, place, and time.      Motor: Weakness present.      Comments: Up with assist , unsteady on feet  Romberg's Negative     Psychiatric:         Mood and Affect: Mood normal.         Behavior: Behavior normal.         Assessment/Plan        OA , multiple joints  Weakness , general  Tingling over extremities     Xray C spine, and L spine  EMG/NCV test of both extremities, upper and lower  PT eval , with Mt   Otc analgesics  Fall precautions  Up with assist, walker  Meloxicam

## 2025-04-11 NOTE — TELEPHONE ENCOUNTER
PATENT CALLED AND LEFT VOICEMAIL ASKING IF SOMETHING ELSE CAN BE SENT IN TO REPLACE HER DULOXETINE  STATES IT MAKES HER TOO DIZZY

## 2025-04-14 ENCOUNTER — DOCUMENTATION (OUTPATIENT)
Dept: PHYSICAL THERAPY | Facility: CLINIC | Age: 75
End: 2025-04-14
Payer: MEDICARE

## 2025-04-14 ENCOUNTER — EVALUATION (OUTPATIENT)
Dept: PHYSICAL THERAPY | Facility: CLINIC | Age: 75
End: 2025-04-14
Payer: MEDICARE

## 2025-04-14 DIAGNOSIS — M79.2 NEUROPATHIC PAIN: ICD-10-CM

## 2025-04-14 DIAGNOSIS — R26.81 UNSTEADY GAIT: ICD-10-CM

## 2025-04-14 PROCEDURE — 97110 THERAPEUTIC EXERCISES: CPT | Mod: GP

## 2025-04-14 PROCEDURE — 97162 PT EVAL MOD COMPLEX 30 MIN: CPT | Mod: GP

## 2025-04-14 ASSESSMENT — ENCOUNTER SYMPTOMS
OCCASIONAL FEELINGS OF UNSTEADINESS: 1
LOSS OF SENSATION IN FEET: 1
DEPRESSION: 0

## 2025-04-14 NOTE — PROGRESS NOTES
Physical Therapy    Discharge Summary    Name: Brie Trevino  MRN: [id number]  : 1950  Date: 25    Discharge Summary: PT    Discharge Information: Date of discharge 25, Date of evaluation 25, Referred by Dr. Reyes, and Referred for Vertigo    Therapy Summary: pt seen by Bessy Marie (now retired) for vertigo and symptoms resolved.  Discharge Status: discharge as goals met     Rehab Discharge Reason: Achieved all and/or the most significant goals(s)

## 2025-04-14 NOTE — PROGRESS NOTES
Physical Therapy Evaluation    Patient Name: Brie Trevino  MRN: [id number]  Today's Date: 4/14/2025  Visit:   3/mn  Insurance: aetna Medicare  Referred by: Dr. Nielsen  Date of onset: 4/3/25    Time Calculation  Start Time: 0945  Stop Time: 1030  Time Calculation (min): 45 min  Diagnosis:   1. Neuropathic pain  Referral to Physical Therapy    Follow Up In Physical Therapy      2. Unsteady gait  Referral to Physical Therapy    Follow Up In Physical Therapy        Problem List Items Addressed This Visit    None  Visit Diagnoses         Codes    Neuropathic pain     M79.2    Relevant Orders    Follow Up In Physical Therapy    Unsteady gait     R26.81    Relevant Orders    Follow Up In Physical Therapy            PRECAUTIONS:    Neuropathy of hands and feet    SUBJECTIVE:   Pt in March fell 3x in 1 day. Last fall was 10 days ago on 4/4/25. She so far has not been injured but is worried about her falls. Falls began only about 1-2 months ago. All falls have been backwards.  Pt has neuropathy in B feet and B hands. (Likely alcohol related)    Pt goals: Decrease falls.  Pain:  -Neuropathy pain constant 7-8/10 numb and tingling.     Home Living:  House, 3 levels includes basement.  5 Steps into the house with a rail, stairs to get to 2nd floor bed and bath, stairs to get to basement.     Prior level of function:   Live alone, used to drive. Silver sneakers.    OBJECTIVE:  Hip AROM: (degrees) Right Left   Flexion 70 deg 70 deg   Extension     Abduction     Adduction     External Rotation     Internal Rotation         Hip Strength: MMT Right Left   Flexion 4/5 4/5   Extension 4-/5 4-/5   Abduction 4+/5 4+/5   Adduction 4/5 4/5   External Rotation /5 /5   Internal Rotation /5 /5     *denotes pain with motion or muscle testing    Balance:  Initial stand: Wide AIDA  Hip width eyes open wnl  NBOS 30 seconds mild ankle strategy  NBOS with head turns up/down/left/right - no LOB    Vitals:  HR 69  O2 96%  BP 90/68    Gait: pt with KELSEY OMALLEY  scissoring intermittently, heel to toe  gait and small steps x 100 ft.    Knee AROM: (degrees) Right Left   Flexion wnl wnl   Extension wnl wnl       Knee Strength: MMT Right Left   Flexion 4/5 5/5   Extension 4+/5 4/5     *denotes pain with motion or muscle testing      Ankle AROM: (degrees) Left Right   Dorsiflexion wnl wnl   Plantarflexion wnl Wnl   Inversion     Eversion         Ankle Strength: MMT Left Right   Dorsiflexion 4+/5 4+/5   Plantarflexion 4/5 4/5   Inversion /5 /5   Eversion /5 /5     *denotes pain with motion or muscle testing      Outcome Measure:  PSFS: 4/40  1) walking without a device 1/10  2) Sit to stand balance 1/10  3) Moving freely without thinking 1/10  4) reciprocal stair negotiation 1/10       ASSESSMENT:  Pt is a 75 y.o. F who presents with an increased history of falls in the last month, pt has neuropathy and low BP. Pt demonstrates moderate LE weakness. Pt has difficulty when falling backwards predominantly. Recommend THORNE balance test next session. Pt will benefit from physical therapy to improve, gait, balance and LE strength as well as balance strategies.    Clinical presentation:     Evolving with changing characteristics,       Complexity:   . Moderate complexity due to patient's clinical presentation being evolving with changing characteristics, with comorbidities/complexities to include neuropathy, frequent falls, low BP, all of which may negatively impact rehab tolerance and progression.     Problems:  Problems to be addressed: ADL impairment, ROM impaired, Balance impaired, and Gait impaired strength impairments.    Personal factors effecting care: pt not driving at this time, relies on her son to drive her.    TREATMENT:  - Therex:  Access Code: [email or web address]  URL: [email or web address]  Date: 04/14/2025  Prepared by: Viviana Huerta    Exercises  - Bilateral Long Arc Quad  - 1 x daily - 7 x weekly - 20 reps - ball between the knees hold  - Standing  Hip Abduction with Counter Support  - 1 x daily - 7 x weekly - 20 reps  - Standing Hip Extension with Counter Support  - 1 x daily - 7 x weekly - 20 reps - lean on the counter top hold  - Standing Marching  - 1 x daily - 7 x weekly - 20 reps  - Heel Toe Raises with Counter Support  - 1 x daily - 7 x weekly - 15-20 reps         PATIENT EDUCATION:  Edu pt on neuropathy and various causes.       PLAN:      Rehab potential: good  Frequency and Duration:  Recommend 1-2 x wk x 6-8 weeks .  Physical therapy will include: therapeutic exercise, therapeutic activity, manual, neuromuscular re-education, gait, ultrasound, electrical stimulation.    Plan of care agreement:  Plan of care was developed with input and agreement by the patient. Yes    GOALS:  Active       PT Problem       Patient will report 0-1/10 vertigo with motion sensitivity testing (Progressing)       Start:  01/28/25    Expected End:  04/28/25            Patient will return to normalized speed with bed mobility and transfers (Progressing)       Start:  01/28/25    Expected End:  04/28/25            Patient will perform SLS for 10 sec with improved stability to manage stairs  (Progressing)       Start:  01/28/25    Expected End:  04/28/25            Patient will score < 4/100 on DHI, rate improvement at 90% (Progressing)       Start:  01/28/25    Expected End:  04/28/25            Patient will return to her Silver Sneakers without restriction (Progressing)       Start:  01/28/25    Expected End:  04/28/25

## 2025-04-30 ENCOUNTER — APPOINTMENT (OUTPATIENT)
Dept: PHYSICAL THERAPY | Facility: CLINIC | Age: 75
End: 2025-04-30
Payer: MEDICARE

## 2025-05-05 ENCOUNTER — TREATMENT (OUTPATIENT)
Dept: PHYSICAL THERAPY | Facility: CLINIC | Age: 75
End: 2025-05-05
Payer: MEDICARE

## 2025-05-05 DIAGNOSIS — M79.2 NEUROPATHIC PAIN: ICD-10-CM

## 2025-05-05 DIAGNOSIS — R26.81 UNSTEADY GAIT: ICD-10-CM

## 2025-05-05 PROCEDURE — 97110 THERAPEUTIC EXERCISES: CPT | Mod: GP

## 2025-05-05 PROCEDURE — 97112 NEUROMUSCULAR REEDUCATION: CPT | Mod: GP

## 2025-05-05 ASSESSMENT — BALANCE ASSESSMENTS
TRANSFERS: ABLE TO TRANSFER SAFELY WITH MINOR USE OF HANDS
LONG VERSION TOTAL SCORE (MAX 56): 49
PLACE ALTERNATE FOOT ON STEP OR STOOL WHILE STANDING UNSUPPORTED: ABLE TO STAND INDEPENDENTLY AND COMPLETE 8 STEPS IN GREATER THAN 20 SECONDS
STANDING UNSUPPORTED: ABLE TO STAND SAFELY FOR 2 MINUTES
STANDING UNSUPPORTED WITH EYES CLOSED: ABLE TO STAND 10 SECONDS SAFELY
STANDING UNSUPPORTED WITH FEET TOGETHER: ABLE TO PLACE FEET TOGETHER INDEPENDENTLY AND STAND 1 MINUTE SAFELY
STANDING ON ONE LEG: ABLE TO LIFT LEG INDEPENDENTLY AND HOLD 5-10 SECONDS
STANDING TO SITTING: ABLE TO STAND INDEPENDENTLY USING HANDS
PICK UP OBJECT FROM THE FLOOR FROM A STANDING POSITION: ABLE TO PICK UP SLIPPER SAFELY AND EASILY
STANDING TO SITTING: CONTROLS DESCENT BY USING HANDS
PLACE ALTERNATE FOOT ON STEP OR STOOL WHILE STANDING UNSUPPORTED: LOOKS BEHIND FROM BOTH SIDES AND WEIGHT SHIFTS WELL
STANDING UNSUPPORTED ONE FOOT IN FRONT: ABLE TO TAKE SMALL STEP INDEPENDENTLY AND HOLD 30 SECONDS
REACHING FORWARD WITH OUTSTRETCHED ARM WHILE STANDING: CAN REACH FORWARD 12 CM (5 INCHES)
SITTING WITH BACK UNSUPPORTED BUT FEET SUPPORTED ON FLOOR OR ON A STOOL: ABLE TO SIT SAFELY AND SECURELY FOR 2 MINUTES
TURN 360 DEGREES: ABLE TO TURN 360 DEGREES SAFELY IN 4 SECONDS OR LESS

## 2025-05-05 NOTE — PROGRESS NOTES
Physical Therapy Treatment    Patient Name: Brie Trevino  MRN: [id number]  Today's Date: 5/5/2025   Visit 2/mn  2    Insurance: aetna Medicare  Referred by: Dr. Nielsen  Date of onset: 4/3/25  Time Calculation  Start Time: 1110  Stop Time: 1150  Time Calculation (min): 40 min  Diagnosis:   1. Neuropathic pain  Follow Up In Physical Therapy      2. Unsteady gait  Follow Up In Physical Therapy        Problem List Items Addressed This Visit    None  Visit Diagnoses         Codes      Neuropathic pain     M79.2      Unsteady gait     R26.81               PT Therapeutic Procedures Time Entry  Neuromuscular Re-Education Time Entry: 15  Therapeutic Exercise Time Entry: 25                 Precautions:  Neuropathy of hands and feet   Alcoholism    SUBJECTIVE:  Pt notes no new falls  Pain level: 7/10, numb and tingling feet.    HEP compliance: fair    OBJECTIVE:    -Outcome Measure:  THORNE score 49   Can reach 7.5 inches  Limited rotation bilaterally but equal  Turns 360 in 4 seconds  Performs 8 steps in 20 sec , circumduct's both legs to achive full hip flexion. More unsteady on R LE.  Tandem R 17 sec, L 30 sec  SLS 5 sec L and R LE    Treatment:         - Therapeutic Exercise:   Access Code: [email or web address]  URL: [email or web address]  Date: 04/14/2025  Prepared by: Viviana Huerta     Exercises  - Bilateral Long Arc Quad  - 20 reps - ball between the knees hold  - Standing Hip Abduction with Counter Support  - 20 reps  - Standing Hip Extension with Counter Support  - 1 x daily - 7 x weekly - 20 reps - lean on the counter top hold  - Standing Marching  - x 10 reps  - Heel Toe Raises with Counter Support  - 20 reps        - Neuromuscular Re-education:   MATI completed    ASSESSMENT:  Pt is a 75 y.o. F who presents with an increased history of falls in the last month, pt has neuropathy and low BP. Mati completed this date. Pt found dot circumduct hips to achieve full hip flex and more unsteady R >L.  Reviewed HEP. Updated HEP to include alternating steps on stool and tandem balance.    PLAN:  Work on sit to stand and gait quality.      GOALS:  Active       PT Problem       Pt will have improved PSFS by 10% by discharge       Start:  05/05/25    Expected End:  07/14/25            Pt will have improved THORNE score by 5 point indicating improving balance       Start:  05/05/25    Expected End:  07/14/25            Pt will have improved strength by 1/2 grade from initial eval for gait and transfers.       Start:  05/05/25    Expected End:  07/14/25            Pt will demonstrate improved ambulation x 300 ft, least restrictive device and no LOB.       Start:  05/05/25    Expected End:  06/14/25            Pt will ambulate up/down stairs reciprocally x 10 steps.       Start:  05/05/25    Expected End:  06/14/25            Pt will perform 5 sit to stand on 1st attempt and 1 ue assist.       Start:  05/05/25    Expected End:  06/14/25

## 2025-05-12 ENCOUNTER — TREATMENT (OUTPATIENT)
Dept: PHYSICAL THERAPY | Facility: CLINIC | Age: 75
End: 2025-05-12
Payer: MEDICARE

## 2025-05-12 DIAGNOSIS — R26.81 UNSTEADY GAIT: ICD-10-CM

## 2025-05-12 DIAGNOSIS — M79.2 NEUROPATHIC PAIN: ICD-10-CM

## 2025-05-12 PROCEDURE — 97112 NEUROMUSCULAR REEDUCATION: CPT | Mod: GP

## 2025-05-12 PROCEDURE — 97110 THERAPEUTIC EXERCISES: CPT | Mod: GP

## 2025-05-12 NOTE — PROGRESS NOTES
"  Physical Therapy Treatment    Patient Name: Brie Trevino  MRN: [id number]  Today's Date: 5/12/2025   Visit   3/ mn  Insurance: aetna Medicare  Referred by: Dr. Nielsen  Date of onset: 4/3/25  Time Calculation  Start Time: 1010  Stop Time: 1050  Time Calculation (min): 40 min  Diagnosis:   1. Neuropathic pain  Follow Up In Physical Therapy      2. Unsteady gait  Follow Up In Physical Therapy        Problem List Items Addressed This Visit    None  Visit Diagnoses         Codes      Neuropathic pain     M79.2      Unsteady gait     R26.81               PT Therapeutic Procedures Time Entry  Neuromuscular Re-Education Time Entry: 10  Therapeutic Exercise Time Entry: 30                 Precautions:  Neuropathy of hands and feet   Alcoholism    SUBJECTIVE:  Pt notes no new falls  Pain level: 0/10 tingling in hands worse than feet today.    HEP compliance: good    OBJECTIVE:  Sit to stand test 27 seconds, uses hands B UE.     Less circumduction R>L with alt foot tap on stool    Treatment:         - Therapeutic Exercise:    Access Code: [email or web address]  URL: [email or web address]  Date: 04/14/2025  Prepared by: Viviana Huerta     Exercises  - Bilateral Long Arc Quad  - 20 reps - ball between the knees hold  - Standing Hip Abduction with Counter Support  - 20 reps  - Standing Hip Extension with Counter Support  - 1 x daily - 7 x weekly - 20 reps - lean on the counter top hold  - Standing Marching  - x 10 reps  - Heel Toe Raises with Counter Support  - 20 reps  - sit to stand       - Neuromuscular Re-education:   Tandem: 30\" x 1 each side  Alt foot on stool: 1 hand assist x 10, too fearful      ASSESSMENT:  Pt is a 75 y.o. F who presents with an increased history of falls in the last month, pt has neuropathy and low BP. Pt with improved hip flexion mechanics. Tested sit to stand, pt unable to perform without ue assist.    PLAN:  Obstacle course for balance. Add weights.      GOALS:  Active       PT Problem "       Pt will have improved PSFS by 10% by discharge       Start:  05/05/25    Expected End:  07/14/25            Pt will have improved THORNE score by 5 point indicating improving balance       Start:  05/05/25    Expected End:  07/14/25            Pt will have improved strength by 1/2 grade from initial eval for gait and transfers.       Start:  05/05/25    Expected End:  07/14/25            Pt will demonstrate improved ambulation x 300 ft, least restrictive device and no LOB.       Start:  05/05/25    Expected End:  06/14/25            Pt will ambulate up/down stairs reciprocally x 10 steps.       Start:  05/05/25    Expected End:  06/14/25            Pt will perform 5 sit to stand on 1st attempt and 1 ue assist.       Start:  05/05/25    Expected End:  06/14/25

## 2025-05-13 DIAGNOSIS — L30.9 DERMATITIS: ICD-10-CM

## 2025-05-16 RX ORDER — DESOXIMETASONE 0.5 MG/G
CREAM TOPICAL 2 TIMES DAILY
Qty: 60 G | Refills: 1 | Status: SHIPPED | OUTPATIENT
Start: 2025-05-16

## 2025-05-19 ENCOUNTER — TREATMENT (OUTPATIENT)
Dept: PHYSICAL THERAPY | Facility: CLINIC | Age: 75
End: 2025-05-19
Payer: MEDICARE

## 2025-05-19 DIAGNOSIS — R26.81 UNSTEADY GAIT: ICD-10-CM

## 2025-05-19 DIAGNOSIS — M79.2 NEUROPATHIC PAIN: ICD-10-CM

## 2025-05-19 PROCEDURE — 97110 THERAPEUTIC EXERCISES: CPT | Mod: GP

## 2025-05-19 PROCEDURE — 97112 NEUROMUSCULAR REEDUCATION: CPT | Mod: GP

## 2025-05-19 NOTE — PROGRESS NOTES
"  Physical Therapy Treatment    Patient Name: Brie Trevino  MRN: [id number]  Today's Date: 5/19/2025   Visit   4 / mn  Insurance: tna Medicare  Referred by: Dr. Nielsen  Time Calculation  Start Time: 1014  Stop Time: 1052  Time Calculation (min): 38 min  Diagnosis:   1. Neuropathic pain  Follow Up In Physical Therapy      2. Unsteady gait  Follow Up In Physical Therapy        Problem List Items Addressed This Visit    None  Visit Diagnoses         Codes      Neuropathic pain     M79.2      Unsteady gait     R26.81               PT Therapeutic Procedures Time Entry  Neuromuscular Re-Education Time Entry: 15  Therapeutic Exercise Time Entry: 23                 Precautions:  Neuropathy of hands and feet   Alcoholism    SUBJECTIVE:  Pt late this date. Denies any new falls. Notes she is only walking with a quad cane not her walker lately. Prefers quad cane for when she negotiates stairs especially when no rail is available.    Pain level: L knee pain 7/10    HEP compliance: good    OBJECTIVE:  L tandem 30 sec after several tries  R tandem 35 sec on 2nd try.    Lateral step over: 4 inch foam, cues to step right up to then wide over to clear for next step.     Ambulation: 1 pt cane with arm swing and fair to good speed with mild foot deviation but no scissoring.    Treatment:         - Therapeutic Exercise:     Access Code: [email or web address]  URL: [email or web address]  Date: 04/14/2025  Prepared by: Viviana Huerta     Exercises  - Bilateral Long Arc Quad  - 1 lb ,20 reps - ball between the knees hold  - Standing Hip Abduction with Counter Support  - 1 lb, 20 reps  - Standing Hip Extension with Counter Support  - 1 lb, 20 reps - lean on the counter top hold  - Standing Marching  - 2 x 10 reps seated this date, 1 lb  - Heel Toe Raises with Counter Support  - 20 reps  - sit to stand 5  - amb with 1 pt cane x 200 ft, reciprocal gait and opposite arm swing.     - Neuromuscular Re-education:   -Tandem: 30\" x " 3 each side no ue support  -Obstacle course: over 2 inch wide and 2 inch tall obstacke, over 2 inch high 4 inch wide foam, over 1'x1' object, step onto blue foam and turn x 3 fwd. Pt able to use cane this date.    -Side step over 2 inch wide and 2 inch tall obstacke, over 2 inch high 4 inch wide foam with cane x 6    Alt foot on stool: x0     edu: to pt on some ability to improve neuropathy d/t alcoholism and edu on benefits of vitamin  B to boost nerve repair.    ASSESSMENT:  Pt is a 75 y.o. F who presents with an increased history of falls in the last month, pt has neuropathy and low BP. Pt with fear of falling and not yet confident to complete task without a cane. Pt improved tandem balance this date after practice bilaterally. Pt with improved arm swing with 1 pt cane versus 4 pt cane.    PLAN:  Work on step strategy and reduce reliance on cane/ue's.      GOALS:  Active       PT Problem       Pt will have improved PSFS by 10% by discharge       Start:  05/05/25    Expected End:  07/14/25            Pt will have improved THORNE score by 5 point indicating improving balance       Start:  05/05/25    Expected End:  07/14/25            Pt will have improved strength by 1/2 grade from initial eval for gait and transfers.       Start:  05/05/25    Expected End:  07/14/25            Pt will demonstrate improved ambulation x 300 ft, least restrictive device and no LOB.       Start:  05/05/25    Expected End:  06/14/25            Pt will ambulate up/down stairs reciprocally x 10 steps.       Start:  05/05/25    Expected End:  06/14/25            Pt will perform 5 sit to stand on 1st attempt and 1 ue assist.       Start:  05/05/25    Expected End:  06/14/25

## 2025-05-21 ENCOUNTER — APPOINTMENT (OUTPATIENT)
Dept: PRIMARY CARE | Facility: CLINIC | Age: 75
End: 2025-05-21
Payer: MEDICARE

## 2025-05-21 VITALS
RESPIRATION RATE: 18 BRPM | OXYGEN SATURATION: 98 % | WEIGHT: 173.8 LBS | SYSTOLIC BLOOD PRESSURE: 134 MMHG | DIASTOLIC BLOOD PRESSURE: 70 MMHG | BODY MASS INDEX: 27.93 KG/M2 | HEART RATE: 65 BPM | HEIGHT: 66 IN | TEMPERATURE: 94.4 F

## 2025-05-21 DIAGNOSIS — G62.1 ALCOHOL-INDUCED POLYNEUROPATHY (MULTI): ICD-10-CM

## 2025-05-21 DIAGNOSIS — F10.10 ETOH ABUSE: Primary | ICD-10-CM
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
## 2025-05-21 PROCEDURE — 1159F MED LIST DOCD IN RCRD: CPT | Performed by: INTERNAL MEDICINE

## 2025-05-21 PROCEDURE — 99214 OFFICE O/P EST MOD 30 MIN: CPT | Performed by: INTERNAL MEDICINE

## 2025-05-21 PROCEDURE — 3008F BODY MASS INDEX DOCD: CPT | Performed by: INTERNAL MEDICINE

## 2025-05-21 PROCEDURE — 3075F SYST BP GE 130 - 139MM HG: CPT | Performed by: INTERNAL MEDICINE

## 2025-05-21 PROCEDURE — 3078F DIAST BP <80 MM HG: CPT | Performed by: INTERNAL MEDICINE

## 2025-05-21 ASSESSMENT — PATIENT HEALTH QUESTIONNAIRE - PHQ9
2. FEELING DOWN, DEPRESSED OR HOPELESS: NOT AT ALL
1. LITTLE INTEREST OR PLEASURE IN DOING THINGS: NOT AT ALL
SUM OF ALL RESPONSES TO PHQ9 QUESTIONS 1 AND 2: 0

## 2025-05-21 NOTE — PROGRESS NOTES
Date of Visit: 05/21/2025     Chief Complaint:   Chief Complaint   Patient presents with    Follow-up       HPI:  HPI  Subjective:   Brie Trevino is a 75 y.o. female presents   Etoh abuse  Now going  to [facility]. She feels that it is helping   She didn't realize that her kids knew the extent of her etoh use.  Neuropathy--duloxetine--made her feel groggy so, she stopped taking it.  Suggested that she try again and take it in the evening  States that she fell 3 times in one day.  Saw neurologist.  Had tilt test--abnormal.   She is said that someone from cardio was to set appt for her, but it has not been done yet.  ROS:   Review of Systems      Outpatient Medications:  Current Outpatient Medications   Medication Instructions    allopurinol (ZYLOPRIM) 100 mg, oral, Daily    ammonium lactate (Amlactin) 12 % cream To arms, legs, and trunk daily after shower.    atenolol (TENORMIN) 50 mg, oral, Daily    clobetasol (Temovate) 0.05 % cream Thin layer to affected area daily as needed    desoximetasone (Topicort) 0.05 % cream Topical, 2 times daily    dilTIAZem CD (CARDIZEM CD) 300 mg, oral, Daily    DULoxetine (CYMBALTA) 30 mg, oral, 2 times daily    estradiol (Estrace) 0.01 % (0.1 mg/gram) vaginal cream Insert into the vagina.    folic acid (Folvite) 1 mg tablet TAKE ONE TABLET BY MOUTH ONCE DAILY    hydroCHLOROthiazide (HYDRODIURIL) 25 mg, oral, Daily    hydrOXYzine HCL (ATARAX) 25 mg, oral, 2 times daily, Prn for itching    magnesium oxide (Mag-Ox) 400 mg tablet One tablet bid x 5 days po then on tablet daily for low magnesium    meloxicam (MOBIC) 15 mg, oral, Daily    triamcinolone (Kenalog) 0.1 % cream Thin coat twice daily to affected areas on  for 3-4 weeks, then weekends only. Repeat every few months for flares.       Allergies:  RX Allergies[1]    Medical History[2]     Health Maintenance   Topic Date Due    Bone Density Scan  Never done    Hepatitis C Screening  Never done    Zoster Vaccines (1 of 2) Never done     "COVID-19 Vaccine (2 - 2024-25 season) 09/01/2024    Influenza Vaccine (Season Ended) 09/01/2025    DTaP/Tdap/Td Vaccines (2 - Td or Tdap) 01/07/2026    Medicare Annual Wellness Visit (AWV)  01/11/2026    RSV High Risk: (Elderly (60+) or Pregnant Population) (1 - 1-dose 75+ series) 02/16/2026    Mammogram  05/01/2026    Lipid Panel  01/10/2030    Colorectal Cancer Screening  11/15/2034    Pneumococcal Vaccine  Completed    HIB Vaccines  Aged Out    Hepatitis B Vaccines  Aged Out    IPV Vaccines  Aged Out    Hepatitis A Vaccines  Aged Out    Meningococcal Vaccine  Aged Out    Rotavirus Vaccines  Aged Out    HPV Vaccines  Aged Out       Surgical History[3]    Family History[4]      Social History[5]       Vitals:  /70 (BP Location: Left arm, Patient Position: Sitting, BP Cuff Size: Adult)   Pulse 65   Temp 34.7 °C (94.4 °F) (Temporal)   Resp 18   Ht 1.676 m (5' 6\")   Wt 78.8 kg (173 lb 12.8 oz)   SpO2 98%   BMI 28.05 kg/m²   Wt Readings from Last 3 Encounters:   05/21/25 78.8 kg (173 lb 12.8 oz)   04/10/25 76.2 kg (168 lb)   03/26/25 78.5 kg (173 lb)     BP Readings from Last 3 Encounters:   05/21/25 134/70   04/10/25 115/70   03/26/25 150/80        Physical Exam:   Physical Exam  Vitals reviewed.   Constitutional:       Appearance: Normal appearance.   HENT:      Head: Normocephalic and atraumatic.   Cardiovascular:      Rate and Rhythm: Normal rate and regular rhythm.      Heart sounds: Normal heart sounds, S1 normal and S2 normal. No murmur heard.  Pulmonary:      Effort: Pulmonary effort is normal.      Breath sounds: Normal breath sounds. No wheezing, rhonchi or rales.   Abdominal:      General: Bowel sounds are normal.      Palpations: Abdomen is soft.   Skin:     General: Skin is warm and dry.   Neurological:      General: No focal deficit present.      Mental Status: She is alert and oriented to person, place, and time.          Assessment/Plan   Diagnoses and all orders for this visit:  ETOH " abuse  Alcohol-induced polyneuropathy (Multi)  Fall, sequela      Orders:  No orders of the defined types were placed in this encounter.       Followup Appts:  Future Appointments   Date Time Provider Department Center   5/29/2025 12:00 PM Viviana Jacob, PT [id number]  ____________________________________________________________  Elliot Reyes MD       [1]   Allergies  Allergen Reactions    Lisinopril Swelling     Swelling on face   [2] No past medical history on file.  [3] No past surgical history on file.  [4] No family history on file.  [5]   Social History  Socioeconomic History    Marital status: Single   Tobacco Use    Smoking status: Never     Passive exposure: Never    Smokeless tobacco: Never   Substance and Sexual Activity    Alcohol use: Not Currently    Drug use: Never

## 2025-05-23 DIAGNOSIS — E83.42 HYPOMAGNESEMIA: ICD-10-CM

## 2025-05-29 ENCOUNTER — TREATMENT (OUTPATIENT)
Dept: PHYSICAL THERAPY | Facility: CLINIC | Age: 75
End: 2025-05-29
Payer: MEDICARE

## 2025-05-29 DIAGNOSIS — R26.81 UNSTEADY GAIT: ICD-10-CM

## 2025-05-29 DIAGNOSIS — M79.2 NEUROPATHIC PAIN: ICD-10-CM

## 2025-05-29 PROCEDURE — 97110 THERAPEUTIC EXERCISES: CPT | Mod: GP

## 2025-05-29 PROCEDURE — 97112 NEUROMUSCULAR REEDUCATION: CPT | Mod: GP

## 2025-05-29 RX ORDER — CALCIUM CARBONATE 300MG(750)
TABLET,CHEWABLE ORAL
Qty: 90 TABLET | Refills: 1 | Status: SHIPPED | OUTPATIENT
Start: 2025-05-29

## 2025-05-29 NOTE — PROGRESS NOTES
"  Physical Therapy Treatment    Patient Name: Brie Trevino  MRN: [id number]  Today's Date: 5/29/2025   Visit   5   / mn  Insurance: aetna Medicare  Referred by: Dr. Nielsen  Time Calculation  Start Time: 1207  Stop Time: 1247  Time Calculation (min): 40 min  Diagnosis:   1. Neuropathic pain  Follow Up In Physical Therapy      2. Unsteady gait  Follow Up In Physical Therapy        Problem List Items Addressed This Visit    None  Visit Diagnoses         Codes      Neuropathic pain     M79.2      Unsteady gait     R26.81               PT Therapeutic Procedures Time Entry  Neuromuscular Re-Education Time Entry: 17  Therapeutic Exercise Time Entry: 23                 Precautions:  Neuropathy of hands and feet   Alcoholism    SUBJECTIVE:  Almost fell yesterday, friend caught her. Had tilt test and says her heart rate was not responding how she was supposed to. Pain seems to be lessening.    Pain level: 0/10    HEP compliance: fair to good    OBJECTIVE:  Tandem L 30 sec, R 22 sec  Alt foot on step: pt kicks stool with R foot several times and circumducting this date. Min ax1 with belt on d/t unsteady.    Sidestep: turns toes out.       Treatment:         - Therapeutic Exercise:    Access Code: [email or web address]  URL: [email or web address]  Date: 04/14/2025  Prepared by: Viviana Huerta     Exercises  - Bilateral Long Arc Quad  - 1 lb ,20 reps - ball between the knees hold  - Standing Hip Abduction with Counter Support  - 1 lb, 20 reps  - Standing Hip Extension with Counter Support  - 1 lb, 20 reps - lean on the counter top hold  - Standing Marching  - 2 x 10 reps seated this date, 1 lb  - Heel Toe Raises with Counter Support  - 20 reps  - sit to stand x 3  -mini squat 5\" x 10  - amb with 1 pt cane x 200 ft, reciprocal gait and opposite arm swing.     - Neuromuscular Re-education:   -Tandem: 30\" x 3 each side no ue support  -Obstacle course: over 2 inch wide and 2 inch tall obstacke, over 2 inch high 4 inch " wide foam, over 1'x1' object, step onto blue foam and turn x 3 fwd. Pt able to use cane this date.  -step up with cane x 6 to 4 inch step  -Lunge step for step strategy 4 x 5 L, R, retro each LE  -Side step over 2 inch wide and 2 inch tall obstacke, over 2 inch high 4 inch wide foam without cane x 6     Alt foot on stool: x 0    ASSESSMENT:  Pt is a 75 y.o. F who presents with an increased history of falls in the last month, pt has neuropathy and low BP. Pt using 1 pt cane this date. Pt continues to have difficulty picking up R LE for step taps needing min ax1.    PLAN:  Work on foot clearance and improve abd and hip flex strength.      GOALS:  Active       PT Problem       Pt will have improved PSFS by 10% by discharge       Start:  05/05/25    Expected End:  07/14/25            Pt will have improved THORNE score by 5 point indicating improving balance       Start:  05/05/25    Expected End:  07/14/25            Pt will have improved strength by 1/2 grade from initial eval for gait and transfers.       Start:  05/05/25    Expected End:  07/14/25            Pt will demonstrate improved ambulation x 300 ft, least restrictive device and no LOB.       Start:  05/05/25    Expected End:  06/14/25            Pt will ambulate up/down stairs reciprocally x 10 steps.       Start:  05/05/25    Expected End:  06/14/25            Pt will perform 5 sit to stand on 1st attempt and 1 ue assist.       Start:  05/05/25    Expected End:  06/14/25

## 2025-06-06 ENCOUNTER — TREATMENT (OUTPATIENT)
Dept: PHYSICAL THERAPY | Facility: CLINIC | Age: 75
End: 2025-06-06
Payer: MEDICARE

## 2025-06-06 DIAGNOSIS — H81.10 BENIGN PAROXYSMAL POSITIONAL VERTIGO, UNSPECIFIED LATERALITY: ICD-10-CM

## 2025-06-06 PROCEDURE — 97116 GAIT TRAINING THERAPY: CPT | Mod: GP

## 2025-06-06 PROCEDURE — 97110 THERAPEUTIC EXERCISES: CPT | Mod: GP

## 2025-06-06 PROCEDURE — 97112 NEUROMUSCULAR REEDUCATION: CPT | Mod: GP

## 2025-06-06 NOTE — PROGRESS NOTES
"  Physical Therapy Treatment    Patient Name: Brie Trevino  MRN: [id number]  Today's Date: 6/6/2025   Visit   6/ mn    Insurance: aetna Medicare  Referred by: Dr. Nielsen  Date of onset: 4/3/25  Time Calculation  Start Time: 0937  Stop Time: 1020  Time Calculation (min): 43 min  Diagnosis:   1. Benign paroxysmal positional vertigo, unspecified laterality  Follow Up In Physical Therapy        Problem List Items Addressed This Visit           ICD-10-CM    Benign paroxysmal positional vertigo H81.10          PT Therapeutic Procedures Time Entry  Neuromuscular Re-Education Time Entry: 17  Therapeutic Exercise Time Entry: 18  Gait Training Time Entry: 8                 Precautions:  Neuropathy of hands and feet   Alcoholism    SUBJECTIVE:  Walking with no cane x 4 days, tingling feels decreased.  No LOB recently.    Pain level: 0/10    HEP compliance: good    OBJECTIVE:  Gait: no cane, 300-400 ft amb with minor drifting.    SLS  L 10 sec, R 6 sec       Treatment:         - Therapeutic Exercise:     Access Code: [email or web address]  URL: [email or web address]  Date: 04/14/2025  Prepared by: Viviana Huerta     Exercises  - Bilateral Long Arc Quad  - 1 lb x 20 reps - ball between the knees hold  - Standing Hip Abduction with Counter Support  - 1 lb, 20 reps  - Standing Hip Extension with Counter Support  - 1 lb, 20 reps - lean on the counter top hold  - Standing Marching  - 2 x 10 reps seated this date, 0 lb  - Heel Toe Raises with Counter Support  - 20 reps  - sit to stand x 3  -mini squat 5\" x 0  - amb with no assistive device x 400 ft x1, reciprocal gait and opposite arm swing. Amb with head turns x 200 ft mild drifting.     - Neuromuscular Re-education:   -Tandem: 30\" x 3 each side no ue support  - alt step tap 5 x 3 no ue  -step up with 1 rail x 10 to 4 inch step  -Lunge step for step strategy 4 x 5 L, R, retro, L/R, fwd each LE  - sls 15\" x 3 each LE    -Side step over 2 inch wide and 2 inch tall " obstacke, over 2 inch high 4 inch wide foam without cane x 0   -Obstacle course: x 0      ASSESSMENT:  Pt is a 75 y.o. F who presents with an increased history of falls in the last month, pt has neuropathy and low BP. Pt with improving hip flexion strength. Improved foot clearance and balance. Pt still limited in sls but did not need a cane for balance.    PLAN:  Work on balance while walking with least restrictive device. Improve sls.      GOALS:  Active       PT Problem       Pt will have improved PSFS by 10% by discharge       Start:  05/05/25    Expected End:  07/14/25            Pt will have improved THORNE score by 5 point indicating improving balance       Start:  05/05/25    Expected End:  07/14/25            Pt will have improved strength by 1/2 grade from initial eval for gait and transfers.       Start:  05/05/25    Expected End:  07/14/25            Pt will demonstrate improved ambulation x 300 ft, least restrictive device and no LOB.       Start:  05/05/25    Expected End:  06/14/25            Pt will ambulate up/down stairs reciprocally x 10 steps.       Start:  05/05/25    Expected End:  06/14/25            Pt will perform 5 sit to stand on 1st attempt and 1 ue assist.       Start:  05/05/25    Expected End:  06/14/25

## 2025-06-10 PROBLEM — F10.10 ETOH ABUSE: Status: ACTIVE | Noted: 2025-06-10

## 2025-06-10 PROBLEM — G62.1 ALCOHOL-INDUCED POLYNEUROPATHY (MULTI): Status: ACTIVE | Noted: 2025-06-10

## 2025-06-11 ENCOUNTER — TREATMENT (OUTPATIENT)
Dept: PHYSICAL THERAPY | Facility: CLINIC | Age: 75
End: 2025-06-11
Payer: MEDICARE

## 2025-06-11 DIAGNOSIS — H81.10 BENIGN PAROXYSMAL POSITIONAL VERTIGO, UNSPECIFIED LATERALITY: ICD-10-CM

## 2025-06-11 DIAGNOSIS — R26.81 UNSTEADY GAIT: ICD-10-CM

## 2025-06-11 DIAGNOSIS — M79.2 NEUROPATHIC PAIN: Primary | ICD-10-CM

## 2025-06-11 PROCEDURE — 97112 NEUROMUSCULAR REEDUCATION: CPT | Mod: GP

## 2025-06-11 PROCEDURE — 97110 THERAPEUTIC EXERCISES: CPT | Mod: GP

## 2025-06-11 NOTE — PROGRESS NOTES
"  Physical Therapy Treatment    Patient Name: Brie Trevino  MRN: [id number]  Today's Date: 6/11/2025   Visit   7  mn    Insurance: aetna Medicare  Referred by: Dr. Nielsen  Date of onset: 4/3/25    Time Calculation  Start Time: 0804  Stop Time: 0849  Time Calculation (min): 45 min  Diagnosis:   1. Neuropathic pain  Follow Up In Physical Therapy      2. Benign paroxysmal positional vertigo, unspecified laterality  Follow Up In Physical Therapy      3. Unsteady gait  Follow Up In Physical Therapy        Problem List Items Addressed This Visit           ICD-10-CM    Benign paroxysmal positional vertigo H81.10    Neuropathic pain - Primary M79.2    Unsteady gait R26.81          PT Therapeutic Procedures Time Entry  Therapeutic Exercise Time Entry: 22  Neuromuscular Re-Education Time Entry: 23                 Precautions:  Neuropathy of hands and feet   Alcoholism    SUBJECTIVE:  No true falls but had a near fall when bumped into. No cane today. Has been trying to walk more upright when going up her stairs.    Pain level: 0/10    HEP compliance: good    OBJECTIVE:  Tandem 30 seconds L and R  SLS   L 13 seconds  R 30    Alt foot tap to stool 10 in 20 seconds         Treatment:         - Therapeutic Exercise:     Access Code: [email or web address]  URL: [email or web address]  Date: 04/14/2025  Prepared by: Viviana Huerta     Exercises  - Bilateral Long Arc Quad  - 1.5 lb x 20 reps - ball between the knees hold  - Standing Hip Abduction with Counter Support  - 1 lb, 20 reps  - Standing Hip Extension with Counter Support  - 1 lb, 20 reps - lean on the counter top hold  - Seated Marching  - 2 x 10 reps seated this date, 1.5 lb  - Heel Toe Raises with Counter Support  - 20 reps  - sit to stand x 3  -mini squat 5\" x 0  - amb with no assistive device x 400 ft x1, reciprocal gait and opposite arm swing. Amb with head turns x 200 ft mild drifting.     - Neuromuscular Re-education:   -Tandem: 30\" x 3 each side no ue " "support  - alt step tap 5 x 4 no ue  -step up with 1 rail x 10 to 4 inch step  -Lunge step for step strategy 4 x 5 L, R, retro, L/R, fwd each LE  - sls 45\" x 1 each LE  -foam with pertubations in NBOS x 2 min     -Side step x 0      ASSESSMENT:  Pt is a 75 y.o. F who presents with an increased history of falls in the last month, pt has neuropathy and low BP. Pt with improving sls this date with cues for core activation.    PLAN:  Resume step ups, increase sls repetitions for LE stability, retest sit to stand test.      GOALS:  Active       PT Problem       Pt will have improved PSFS by 10% by discharge       Start:  05/05/25    Expected End:  07/14/25            Pt will have improved THORNE score by 5 point indicating improving balance       Start:  05/05/25    Expected End:  07/14/25            Pt will have improved strength by 1/2 grade from initial eval for gait and transfers.       Start:  05/05/25    Expected End:  07/14/25            Pt will demonstrate improved ambulation x 300 ft, least restrictive device and no LOB.       Start:  05/05/25    Expected End:  06/14/25            Pt will ambulate up/down stairs reciprocally x 10 steps.       Start:  05/05/25    Expected End:  06/14/25            Pt will perform 5 sit to stand on 1st attempt and 1 ue assist.       Start:  05/05/25    Expected End:  06/14/25                      "

## 2025-06-13 DIAGNOSIS — R94.09 ABNORMAL TILT TABLE TEST: Primary | ICD-10-CM

## 2025-06-19 ENCOUNTER — TREATMENT (OUTPATIENT)
Dept: PHYSICAL THERAPY | Facility: CLINIC | Age: 75
End: 2025-06-19
Payer: MEDICARE

## 2025-06-19 DIAGNOSIS — H81.10 BENIGN PAROXYSMAL POSITIONAL VERTIGO, UNSPECIFIED LATERALITY: ICD-10-CM

## 2025-06-19 PROCEDURE — 97112 NEUROMUSCULAR REEDUCATION: CPT | Mod: GP

## 2025-06-19 PROCEDURE — 97110 THERAPEUTIC EXERCISES: CPT | Mod: GP

## 2025-06-19 NOTE — PROGRESS NOTES
"  Physical Therapy Treatment/ Discharge    Patient Name: Brie Trevino  MRN: [id number]  Today's Date: 6/19/2025   Visit   8  mn    Insurance: aetna Medicare  Referred by: Dr. Nielsen  Date of onset: 4/3/25  Time Calculation  Start Time: 1210  Stop Time: 1255  Time Calculation (min): 45 min  Diagnosis:   1. Benign paroxysmal positional vertigo, unspecified laterality  Follow Up In Physical Therapy        Problem List Items Addressed This Visit           ICD-10-CM    Benign paroxysmal positional vertigo H81.10          PT Therapeutic Procedures Time Entry  Therapeutic Exercise Time Entry: 20  Neuromuscular Re-Education Time Entry: 25                 Precautions:  Neuropathy of hands and feet   Alcoholism    SUBJECTIVE:  Went back to silver sneakers    Pain level: 0/10, tingling in tips of fingers    HEP compliance: good    OBJECTIVE:  PSFS: 21/40  1) walking without a device 7/10  2) Sit to stand balance 5/10  3) Moving freely without thinking 6/10  4) reciprocal stair negotiation 3/10, using rail and non reciprocal gait but no longer crawling up    Tandem:  L 30 sec  R 30 sec with more ankle strategy    SLS  R LE 22 sec  L LE 17 sec    Alt foot step 13 / 20 sec              Hip Strength: MMT Right Left   Flexion 4/5 4/5   Extension 4/5 4/5   Abduction 5/5 5/5   Adduction 4/5 4/5   External Rotation /5 /5   Internal Rotation /5 /5       Knee Strength: MMT Right Left   Flexion 4+/5 5/5   Extension 4+/5 4+/5       Ankle Strength: MMT Left Right   Dorsiflexion 4+/5 5/5   Plantarflexion 5/5 5/5   Inversion /5 /5   Eversion /5 /5       Treatment:         - Therapeutic Exercise:   LAQ ball 1.5 lb x 20 B LE  Reassessed goals      - Neuromuscular Re-education:   -Tandem: 30\" x 3 each side no ue support  - alt step tap x 25 no ue  -step up with 1 rail x 10 to 6 inch step  -Lunge step for step strategy 4 x 5 L, R, retro, L/R, fwd each LE  -SLS 45\" x 1 each LE    ASSESSMENT:  Pt is a 75 y.o. F who presents with an increased history " of falls in the last month, pt has neuropathy and low BP. Pt increased PSFS 17 points. Tandem 30 seconds bilaterally. SLS improved bilaterally from 5 seconds at eval to 17 and 22 seconds. Strength is progressing and pt aware of deficits to continue to work on. All goals progressing or met. Pt appropriate and agreeable for discharge.    PLAN:  Discharge      GOALS:  Resolved       PT Problem       Pt will have improved PSFS by 10% by discharge (Met)       Start:  05/05/25    Expected End:  07/14/25    Resolved:  06/19/25         Pt will have improved THORNE score by 5 point indicating improving balance (Met)       Start:  05/05/25    Expected End:  07/14/25    Resolved:  06/19/25         Pt will have improved strength by 1/2 grade from initial eval for gait and transfers. (Progressing)       Start:  05/05/25    Expected End:  07/14/25    Resolved:  06/19/25         Pt will demonstrate improved ambulation x 300 ft, least restrictive device and no LOB. (Met)       Start:  05/05/25    Expected End:  06/14/25    Resolved:  06/19/25         Pt will ambulate up/down stairs reciprocally x 10 steps. (Progressing)       Start:  05/05/25    Expected End:  06/14/25    Resolved:  06/19/25         Pt will perform 5 sit to stand on 1st attempt and 1 ue assist. (Met)       Start:  05/05/25    Expected End:  06/14/25    Resolved:  06/19/25

## 2025-06-29 DIAGNOSIS — B02.9 HERPES ZOSTER WITHOUT COMPLICATION: ICD-10-CM

## 2025-06-29 DIAGNOSIS — M15.0 PRIMARY OSTEOARTHRITIS INVOLVING MULTIPLE JOINTS: ICD-10-CM

## 2025-06-29 DIAGNOSIS — M79.642 PAIN IN BOTH HANDS: ICD-10-CM

## 2025-06-29 DIAGNOSIS — I10 ESSENTIAL (PRIMARY) HYPERTENSION: ICD-10-CM

## 2025-06-29 DIAGNOSIS — M79.641 PAIN IN BOTH HANDS: ICD-10-CM

## 2025-06-29 DIAGNOSIS — M79.2 NEUROPATHIC PAIN: ICD-10-CM

## 2025-06-29 DIAGNOSIS — E83.42 HYPOMAGNESEMIA: ICD-10-CM

## 2025-06-29 DIAGNOSIS — M10.9 GOUT, UNSPECIFIED CAUSE, UNSPECIFIED CHRONICITY, UNSPECIFIED SITE: ICD-10-CM

## 2025-07-01 RX ORDER — MELOXICAM 15 MG/1
15 TABLET ORAL DAILY
Qty: 90 TABLET | Refills: 1 | Status: SHIPPED | OUTPATIENT
Start: 2025-07-01

## 2025-07-01 RX ORDER — DILTIAZEM HYDROCHLORIDE 300 MG/1
300 CAPSULE, COATED, EXTENDED RELEASE ORAL DAILY
Qty: 90 CAPSULE | Refills: 1 | Status: SHIPPED | OUTPATIENT
Start: 2025-07-01

## 2025-07-01 RX ORDER — DULOXETIN HYDROCHLORIDE 30 MG/1
30 CAPSULE, DELAYED RELEASE ORAL 2 TIMES DAILY
Qty: 180 CAPSULE | Refills: 1 | Status: SHIPPED | OUTPATIENT
Start: 2025-07-01

## 2025-07-01 RX ORDER — ALLOPURINOL 100 MG/1
100 TABLET ORAL DAILY
Qty: 90 TABLET | Refills: 1 | Status: SHIPPED | OUTPATIENT
Start: 2025-07-01

## 2025-07-01 RX ORDER — HYDROCHLOROTHIAZIDE 25 MG/1
25 TABLET ORAL DAILY
Qty: 90 TABLET | Refills: 1 | Status: SHIPPED | OUTPATIENT
Start: 2025-07-01

## 2025-07-01 RX ORDER — FOLIC ACID 1 MG/1
TABLET ORAL
Qty: 90 TABLET | Refills: 1 | Status: SHIPPED | OUTPATIENT
Start: 2025-07-01

## 2025-07-01 RX ORDER — ATENOLOL 50 MG/1
50 TABLET ORAL DAILY
Qty: 90 TABLET | Refills: 1 | Status: SHIPPED | OUTPATIENT
Start: 2025-07-01

## 2025-07-01 RX ORDER — CALCIUM CARBONATE 300MG(750)
TABLET,CHEWABLE ORAL
Qty: 90 TABLET | Refills: 1 | Status: SHIPPED | OUTPATIENT
Start: 2025-07-01

## 2025-07-01 RX ORDER — HYDROXYZINE HYDROCHLORIDE 25 MG/1
TABLET, FILM COATED ORAL
Qty: 180 TABLET | Refills: 0 | Status: SHIPPED | OUTPATIENT
Start: 2025-07-01

## 2025-07-07 DIAGNOSIS — I10 ESSENTIAL (PRIMARY) HYPERTENSION: ICD-10-CM

## 2025-07-10 RX ORDER — DILTIAZEM HYDROCHLORIDE 300 MG/1
300 CAPSULE, COATED, EXTENDED RELEASE ORAL DAILY
Qty: 90 CAPSULE | Refills: 2 | OUTPATIENT
Start: 2025-07-10

## 2025-08-19 ENCOUNTER — OFFICE VISIT (OUTPATIENT)
Dept: PRIMARY CARE | Facility: CLINIC | Age: 75
End: 2025-08-19
Payer: MEDICARE

## 2025-08-19 VITALS
HEART RATE: 69 BPM | SYSTOLIC BLOOD PRESSURE: 120 MMHG | OXYGEN SATURATION: 95 % | BODY MASS INDEX: 26.36 KG/M2 | HEIGHT: 66 IN | DIASTOLIC BLOOD PRESSURE: 72 MMHG | WEIGHT: 164 LBS

## 2025-08-19 DIAGNOSIS — L23.9 ALLERGIC DERMATITIS: Primary | ICD-10-CM

## 2025-08-19 PROCEDURE — 1159F MED LIST DOCD IN RCRD: CPT | Performed by: INTERNAL MEDICINE

## 2025-08-19 PROCEDURE — 3074F SYST BP LT 130 MM HG: CPT | Performed by: INTERNAL MEDICINE

## 2025-08-19 PROCEDURE — 3008F BODY MASS INDEX DOCD: CPT | Performed by: INTERNAL MEDICINE

## 2025-08-19 PROCEDURE — 3078F DIAST BP <80 MM HG: CPT | Performed by: INTERNAL MEDICINE

## 2025-08-19 PROCEDURE — 99213 OFFICE O/P EST LOW 20 MIN: CPT | Performed by: INTERNAL MEDICINE

## 2025-08-19 RX ORDER — METHYLPREDNISOLONE 4 MG/1
TABLET ORAL
Qty: 21 TABLET | Refills: 0 | Status: SHIPPED | OUTPATIENT
Start: 2025-08-19 | End: 2025-08-25